# Patient Record
Sex: MALE | Race: OTHER | HISPANIC OR LATINO | URBAN - METROPOLITAN AREA
[De-identification: names, ages, dates, MRNs, and addresses within clinical notes are randomized per-mention and may not be internally consistent; named-entity substitution may affect disease eponyms.]

---

## 2017-07-18 ENCOUNTER — EMERGENCY (EMERGENCY)
Facility: HOSPITAL | Age: 47
LOS: 0 days | Discharge: HOME | End: 2017-07-19

## 2017-07-18 DIAGNOSIS — N20.0 CALCULUS OF KIDNEY: ICD-10-CM

## 2017-07-18 DIAGNOSIS — N28.89 OTHER SPECIFIED DISORDERS OF KIDNEY AND URETER: ICD-10-CM

## 2017-07-18 DIAGNOSIS — Z88.2 ALLERGY STATUS TO SULFONAMIDES: ICD-10-CM

## 2017-07-18 DIAGNOSIS — Z79.899 OTHER LONG TERM (CURRENT) DRUG THERAPY: ICD-10-CM

## 2017-07-18 DIAGNOSIS — Z79.2 LONG TERM (CURRENT) USE OF ANTIBIOTICS: ICD-10-CM

## 2017-07-18 DIAGNOSIS — R10.9 UNSPECIFIED ABDOMINAL PAIN: ICD-10-CM

## 2018-07-13 ENCOUNTER — INPATIENT (INPATIENT)
Facility: HOSPITAL | Age: 48
LOS: 1 days | Discharge: HOME | End: 2018-07-15
Attending: HOSPITALIST | Admitting: HOSPITALIST

## 2018-07-13 VITALS
SYSTOLIC BLOOD PRESSURE: 153 MMHG | HEART RATE: 57 BPM | OXYGEN SATURATION: 99 % | RESPIRATION RATE: 18 BRPM | TEMPERATURE: 97 F | DIASTOLIC BLOOD PRESSURE: 75 MMHG

## 2018-07-13 DIAGNOSIS — Z90.5 ACQUIRED ABSENCE OF KIDNEY: Chronic | ICD-10-CM

## 2018-07-13 LAB
ALBUMIN SERPL ELPH-MCNC: 4.7 G/DL — SIGNIFICANT CHANGE UP (ref 3.5–5.2)
ALP SERPL-CCNC: 70 U/L — SIGNIFICANT CHANGE UP (ref 30–115)
ALT FLD-CCNC: 24 U/L — SIGNIFICANT CHANGE UP (ref 0–41)
ANION GAP SERPL CALC-SCNC: 13 MMOL/L — SIGNIFICANT CHANGE UP (ref 7–14)
APTT BLD: 35.6 SEC — SIGNIFICANT CHANGE UP (ref 27–39.2)
AST SERPL-CCNC: 17 U/L — SIGNIFICANT CHANGE UP (ref 0–41)
BASOPHILS # BLD AUTO: 0.02 K/UL — SIGNIFICANT CHANGE UP (ref 0–0.2)
BASOPHILS NFR BLD AUTO: 0.3 % — SIGNIFICANT CHANGE UP (ref 0–1)
BILIRUB SERPL-MCNC: 1 MG/DL — SIGNIFICANT CHANGE UP (ref 0.2–1.2)
BUN SERPL-MCNC: 20 MG/DL — SIGNIFICANT CHANGE UP (ref 10–20)
CALCIUM SERPL-MCNC: 9.4 MG/DL — SIGNIFICANT CHANGE UP (ref 8.5–10.1)
CHLORIDE SERPL-SCNC: 105 MMOL/L — SIGNIFICANT CHANGE UP (ref 98–110)
CK MB CFR SERPL CALC: 1.2 NG/ML — SIGNIFICANT CHANGE UP (ref 0.6–6.3)
CK SERPL-CCNC: 82 U/L — SIGNIFICANT CHANGE UP (ref 0–225)
CO2 SERPL-SCNC: 25 MMOL/L — SIGNIFICANT CHANGE UP (ref 17–32)
CREAT SERPL-MCNC: 1.2 MG/DL — SIGNIFICANT CHANGE UP (ref 0.7–1.5)
EOSINOPHIL # BLD AUTO: 0.59 K/UL — SIGNIFICANT CHANGE UP (ref 0–0.7)
EOSINOPHIL NFR BLD AUTO: 8.8 % — HIGH (ref 0–8)
GAS PNL BLDV: SIGNIFICANT CHANGE UP
GLUCOSE SERPL-MCNC: 122 MG/DL — HIGH (ref 70–99)
HCT VFR BLD CALC: 39.7 % — LOW (ref 42–52)
HGB BLD-MCNC: 13.9 G/DL — LOW (ref 14–18)
IMM GRANULOCYTES NFR BLD AUTO: 0.6 % — HIGH (ref 0.1–0.3)
INR BLD: 1.06 RATIO — SIGNIFICANT CHANGE UP (ref 0.65–1.3)
LYMPHOCYTES # BLD AUTO: 1.06 K/UL — LOW (ref 1.2–3.4)
LYMPHOCYTES # BLD AUTO: 15.8 % — LOW (ref 20.5–51.1)
MCHC RBC-ENTMCNC: 29.1 PG — SIGNIFICANT CHANGE UP (ref 27–31)
MCHC RBC-ENTMCNC: 35 G/DL — SIGNIFICANT CHANGE UP (ref 32–37)
MCV RBC AUTO: 83.1 FL — SIGNIFICANT CHANGE UP (ref 80–94)
MONOCYTES # BLD AUTO: 0.54 K/UL — SIGNIFICANT CHANGE UP (ref 0.1–0.6)
MONOCYTES NFR BLD AUTO: 8.1 % — SIGNIFICANT CHANGE UP (ref 1.7–9.3)
NEUTROPHILS # BLD AUTO: 4.45 K/UL — SIGNIFICANT CHANGE UP (ref 1.4–6.5)
NEUTROPHILS NFR BLD AUTO: 66.4 % — SIGNIFICANT CHANGE UP (ref 42.2–75.2)
NRBC # BLD: 0 /100 WBCS — SIGNIFICANT CHANGE UP (ref 0–0)
PLATELET # BLD AUTO: 184 K/UL — SIGNIFICANT CHANGE UP (ref 130–400)
POTASSIUM SERPL-MCNC: 4.2 MMOL/L — SIGNIFICANT CHANGE UP (ref 3.5–5)
POTASSIUM SERPL-SCNC: 4.2 MMOL/L — SIGNIFICANT CHANGE UP (ref 3.5–5)
PROT SERPL-MCNC: 7.2 G/DL — SIGNIFICANT CHANGE UP (ref 6–8)
PROTHROM AB SERPL-ACNC: 11.4 SEC — SIGNIFICANT CHANGE UP (ref 9.95–12.87)
RBC # BLD: 4.78 M/UL — SIGNIFICANT CHANGE UP (ref 4.7–6.1)
RBC # FLD: 13.2 % — SIGNIFICANT CHANGE UP (ref 11.5–14.5)
SODIUM SERPL-SCNC: 143 MMOL/L — SIGNIFICANT CHANGE UP (ref 135–146)
TROPONIN T SERPL-MCNC: <0.01 NG/ML — SIGNIFICANT CHANGE UP
WBC # BLD: 6.7 K/UL — SIGNIFICANT CHANGE UP (ref 4.8–10.8)
WBC # FLD AUTO: 6.7 K/UL — SIGNIFICANT CHANGE UP (ref 4.8–10.8)

## 2018-07-13 RX ORDER — ATORVASTATIN CALCIUM 80 MG/1
80 TABLET, FILM COATED ORAL AT BEDTIME
Qty: 0 | Refills: 0 | Status: DISCONTINUED | OUTPATIENT
Start: 2018-07-13 | End: 2018-07-15

## 2018-07-13 RX ORDER — ALFUZOSIN HYDROCHLORIDE 10 MG/1
1 TABLET, EXTENDED RELEASE ORAL
Qty: 0 | Refills: 0 | COMMUNITY

## 2018-07-13 RX ORDER — SODIUM CHLORIDE 9 MG/ML
1000 INJECTION INTRAMUSCULAR; INTRAVENOUS; SUBCUTANEOUS
Qty: 0 | Refills: 0 | Status: DISCONTINUED | OUTPATIENT
Start: 2018-07-13 | End: 2018-07-15

## 2018-07-13 RX ORDER — ENOXAPARIN SODIUM 100 MG/ML
40 INJECTION SUBCUTANEOUS DAILY
Qty: 0 | Refills: 0 | Status: DISCONTINUED | OUTPATIENT
Start: 2018-07-13 | End: 2018-07-15

## 2018-07-13 RX ORDER — ACETAMINOPHEN 500 MG
650 TABLET ORAL EVERY 4 HOURS
Qty: 0 | Refills: 0 | Status: DISCONTINUED | OUTPATIENT
Start: 2018-07-13 | End: 2018-07-15

## 2018-07-13 RX ORDER — ASPIRIN/CALCIUM CARB/MAGNESIUM 324 MG
325 TABLET ORAL ONCE
Qty: 0 | Refills: 0 | Status: COMPLETED | OUTPATIENT
Start: 2018-07-13 | End: 2018-07-13

## 2018-07-13 RX ORDER — TAMSULOSIN HYDROCHLORIDE 0.4 MG/1
0.4 CAPSULE ORAL AT BEDTIME
Qty: 0 | Refills: 0 | Status: DISCONTINUED | OUTPATIENT
Start: 2018-07-13 | End: 2018-07-15

## 2018-07-13 RX ORDER — SODIUM CHLORIDE 9 MG/ML
1000 INJECTION INTRAMUSCULAR; INTRAVENOUS; SUBCUTANEOUS ONCE
Qty: 0 | Refills: 0 | Status: COMPLETED | OUTPATIENT
Start: 2018-07-13 | End: 2018-07-13

## 2018-07-13 RX ORDER — ASPIRIN/CALCIUM CARB/MAGNESIUM 324 MG
162 TABLET ORAL DAILY
Qty: 0 | Refills: 0 | Status: DISCONTINUED | OUTPATIENT
Start: 2018-07-13 | End: 2018-07-14

## 2018-07-13 RX ORDER — ASCORBIC ACID 60 MG
1 TABLET,CHEWABLE ORAL
Qty: 0 | Refills: 0 | COMMUNITY

## 2018-07-13 RX ORDER — ESOMEPRAZOLE MAGNESIUM 40 MG/1
1 CAPSULE, DELAYED RELEASE ORAL
Qty: 0 | Refills: 0 | COMMUNITY

## 2018-07-13 RX ORDER — PANTOPRAZOLE SODIUM 20 MG/1
40 TABLET, DELAYED RELEASE ORAL
Qty: 0 | Refills: 0 | Status: DISCONTINUED | OUTPATIENT
Start: 2018-07-13 | End: 2018-07-15

## 2018-07-13 RX ORDER — CHOLECALCIFEROL (VITAMIN D3) 125 MCG
1 CAPSULE ORAL
Qty: 0 | Refills: 0 | COMMUNITY

## 2018-07-13 RX ADMIN — Medication 325 MILLIGRAM(S): at 12:16

## 2018-07-13 RX ADMIN — SODIUM CHLORIDE 75 MILLILITER(S): 9 INJECTION INTRAMUSCULAR; INTRAVENOUS; SUBCUTANEOUS at 18:40

## 2018-07-13 RX ADMIN — SODIUM CHLORIDE 1000 MILLILITER(S): 9 INJECTION INTRAMUSCULAR; INTRAVENOUS; SUBCUTANEOUS at 11:57

## 2018-07-13 RX ADMIN — TAMSULOSIN HYDROCHLORIDE 0.4 MILLIGRAM(S): 0.4 CAPSULE ORAL at 21:50

## 2018-07-13 RX ADMIN — ATORVASTATIN CALCIUM 80 MILLIGRAM(S): 80 TABLET, FILM COATED ORAL at 21:50

## 2018-07-13 RX ADMIN — ENOXAPARIN SODIUM 40 MILLIGRAM(S): 100 INJECTION SUBCUTANEOUS at 21:50

## 2018-07-13 NOTE — H&P ADULT - ASSESSMENT
48M with pmhx of CVA (no residual weakness ~10 years ago), BPH, GERD, Renal tumor stage III (unspecified exactly which) s/p R partial nephrectomy presents from home for Right sided facial numbness, weakness, and slurred speech    R Sided facial weakness, numbness, and Dysarthria: R/O stroke   NIHSS 3 on admission   CTH neg, CTA pending  2d Echo   Admit to stroke unit  ASA 162mg and NS at 75 as per neuro recs  Starting Lipitor 80mg   Neurology following  Speech and Swallow eval    BPH: No alfazosin in hospital, will start tamsulosin     GERD: Continue PPI     DVT Ppx: lovenox  Full code  Diet: DASH 48M with pmhx of CVA (no residual weakness ~10 years ago), BPH, GERD, Renal tumor stage III (unspecified exactly which) s/p R partial nephrectomy presents from home for Right sided facial numbness, weakness, and slurred speech    R Sided facial weakness, numbness, and Dysarthria: R/O stroke   NIHSS 3 on admission   CTH neg, CTA pending  2d Echo   Admit to stroke unit  ASA 162mg and NS at 75 as per neuro recs  Starting Lipitor 80mg   Neurology following  Speech and Swallow eval    BPH: No alfazosin in hospital, will start tamsulosin     GERD: Continue PPI     DVT Ppx: lovenox  Full code  Diet: DASH     Attending co-signer attestation:  Pt seen and examined. Agree with Dr. Hopson and Resident notes. Pt here to r/o CVA. Neuro eval. Neuro Checks, ASA, Statins, BP control. f/u w/up per Neurology.

## 2018-07-13 NOTE — ED ADULT NURSE NOTE - OBJECTIVE STATEMENT
Patient reports waking up this morning with slurred speech and numbness to the right face, Patient has hx of CVA approx 10 years ago without any residual weakness.

## 2018-07-13 NOTE — ED PROVIDER NOTE - PROGRESS NOTE DETAILS
I personally evaluated the patient. I reviewed the Resident’s or Physician Assistant’s note (as assigned above), and agree with the findings and plan except as documented in my note.   49 Y/O M GERD, BPH, TIA (ON ASA 81 MG DAILY), FORMER SMOKER, S/P PARTIAL NEPHRECTOMY C/O R SIDED FACIAL NUMBNESS, DROOP AND SLURRED SPEECH SINCE AWAKENING AT 8 AM TODAY. PT WENT TO BED AT 2AM WITH NO SYMPTOMS. NO EXTREMITY PARESTHESIAS OR MOTOR WEAKNESS. NO ATAXIA. NO HEADACHE, N/V, DIZZINESS, VISION CHANGES. NO CP, SOB, PALPITATIONS. PT WITH L SIDED PARESTHESIAS 2 WEEKS AGO WHICH RESOLVED. VITALS NOTED. ALERT OX3 NAD WELL APPEARING. NCAT, PERRL EOMI. CN 2-12 INTACT EXCEPT FOR R NASOLABIAL FLATTENING AND R LOWER FACE PARESTHESIAS. + MILD DYSARTHRIA. NORMAL VISUAL FIELDS. 5/5 MOTOR STRENGTH B/L UPPER AND B/L LOWER EXT. NO PRONATOR DRIFT. NORMAL FINGER TO NOSE B/L. NECK SUPPLE. NECK SUPPLE. LUNGS CLEAR B/L. RRR S1S2. ABD- SOFT NONTENDER. NO RASH.

## 2018-07-13 NOTE — H&P ADULT - NSHPPHYSICALEXAM_GEN_ALL_CORE
Vital Signs Last 24 Hrs  T(C): 36.2 (13 Jul 2018 13:03), Max: 36.2 (13 Jul 2018 13:03)  T(F): 97.2 (13 Jul 2018 13:03), Max: 97.2 (13 Jul 2018 13:03)  HR: 51 (13 Jul 2018 13:05) (51 - 57)  BP: 148/73 (13 Jul 2018 13:05) (148/73 - 159/84)  RR: 18 (13 Jul 2018 13:05) (18 - 18)  SpO2: 100% (13 Jul 2018 13:05) (99% - 100%)        GENERAL: NAD, well-developed, Non-toxic, stated age   HEAD:  Atraumatic, Normocephalic  EYES: EOMI, PERRLA, conjunctiva and sclera clear. Noted R infraorbital fasciculations. Bandaged wound on L temple s/p mole removal    CHEST/LUNG: Clear to auscultation bilaterally; No wheezing, rhonchi, or crackles  HEART: Bradycardic, regular rhythm; s1, s2, No murmurs, rubs, or gallops  ABDOMEN: Soft, Nontender, Nondistended; Bowel sounds present, No rebound or guarding noted   EXTREMITIES:  No lower extremity edema or calf tenderness to palpation.  No clubbing or cyanosis  PSYCH: AAOx3  NEUROLOGY: CN II-XII grossly intact except for decreased sensation of the Right face present in all distributions of the CN V, R infraorbital fasciculation noted, Mild dysarthria noted but speech remain intelligible, communicating effectively without difficulty expressing or word finding.  5/5 strength in all extremities, no pronator drift, dysdiadokinesia, ataxia noted on exam.   SKIN: No rashes or lesions Vital Signs Last 24 Hrs  T(C): 36.2 (13 Jul 2018 13:03), Max: 36.2 (13 Jul 2018 13:03)  T(F): 97.2 (13 Jul 2018 13:03), Max: 97.2 (13 Jul 2018 13:03)  HR: 51 (13 Jul 2018 13:05) (51 - 57)  BP: 148/73 (13 Jul 2018 13:05) (148/73 - 159/84)  RR: 18 (13 Jul 2018 13:05) (18 - 18)  SpO2: 100% (13 Jul 2018 13:05) (99% - 100%)        GENERAL: NAD, well-developed, Non-toxic, stated age   HEAD:  Atraumatic, Normocephalic  EYES: EOMI, PERRLA, conjunctiva and sclera clear. Noted R infraorbital fasciculations. Bandaged wound on L temple s/p mole removal ; R facial droop  CHEST/LUNG: Clear to auscultation bilaterally; No wheezing, rhonchi, or crackles  HEART: Bradycardic, regular rhythm; s1, s2, No murmurs, rubs, or gallops  ABDOMEN: Soft, Nontender, Nondistended; Bowel sounds present, No rebound or guarding noted   EXTREMITIES:  No lower extremity edema or calf tenderness to palpation.  No clubbing or cyanosis  PSYCH: AAOx3  NEUROLOGY: CN II-XII grossly intact except for decreased sensation of the Right face present in all distributions of the CN V, R infraorbital fasciculation noted, Mild dysarthria noted but speech remain intelligible, communicating effectively without difficulty expressing or word finding.  5/5 strength in all extremities, no pronator drift, dysdiadokinesia, ataxia noted on exam.   SKIN: No rashes or lesions Vital Signs Last 24 Hrs  T(C): 36.2 (13 Jul 2018 13:03), Max: 36.2 (13 Jul 2018 13:03)  T(F): 97.2 (13 Jul 2018 13:03), Max: 97.2 (13 Jul 2018 13:03)  HR: 51 (13 Jul 2018 13:05) (51 - 57)  BP: 148/73 (13 Jul 2018 13:05) (148/73 - 159/84)  RR: 18 (13 Jul 2018 13:05) (18 - 18)  SpO2: 100% (13 Jul 2018 13:05) (99% - 100%)      GENERAL: NAD, well-developed, Non-toxic, stated age   HEAD:  Atraumatic, Normocephalic  EYES: EOMI, PERRLA, conjunctiva and sclera clear. Noted R infraorbital fasciculations. Bandaged wound on L temple s/p mole removal ; R facial droop  CHEST/LUNG: Clear to auscultation bilaterally; No wheezing, rhonchi, or crackles  HEART: Bradycardic, regular rhythm; s1, s2, No murmurs, rubs, or gallops  ABDOMEN: Soft, Nontender, Nondistended; Bowel sounds present, No rebound or guarding noted   EXTREMITIES:  No lower extremity edema or calf tenderness to palpation.  No clubbing or cyanosis  PSYCH: AAOx3  NEUROLOGY: CN II-XII grossly intact except for decreased sensation of the Right face present in all distributions of the CN V, R infraorbital fasciculation noted, Mild dysarthria noted but speech remain intelligible, communicating effectively without difficulty expressing or word finding.  5/5 strength in all extremities, no pronator drift, dysdiadokinesia, ataxia noted on exam.   SKIN: No rashes or lesions

## 2018-07-13 NOTE — ED PROVIDER NOTE - NS ED ROS FT
Eyes:  No visual changes, eye pain or discharge.  ENMT:  no sore throat or runny nose, no difficulty swallowing + slurred speech.   Cardiac:  No chest pain, SOB   Respiratory:  No cough   GI:  No nausea, vomiting, diarrhea or abdominal pain.  :  No dysuria, frequency or burning.  MS:  Nojoint pain or back pain.  Neuro:  No headache or weakness. no dizziness + right facial weakness and numbness with slurred speech.   Skin:  No skin rash.   Endocrine: No history of thyroid disease or diabetes.

## 2018-07-13 NOTE — H&P ADULT - NSHPLABSRESULTS_GEN_ALL_CORE
13.9   6.70  )-----------( 184      ( 13 Jul 2018 11:41 )             39.7       07-13    143  |  105  |  20  ----------------------------<  122<H>  4.2   |  25  |  1.2    Ca    9.4      13 Jul 2018 11:41    TPro  7.2  /  Alb  4.7  /  TBili  1.0  /  DBili  x   /  AST  17  /  ALT  24  /  AlkPhos  70  07-13          PT/INR - ( 13 Jul 2018 11:41 )   PT: 11.40 sec;   INR: 1.06 ratio     PTT - ( 13 Jul 2018 11:41 )  PTT:35.6 sec      CARDIAC MARKERS ( 13 Jul 2018 11:41 )  x     / <0.01 ng/mL / 82 U/L / x     / 1.2 ng/mL      CAPILLARY BLOOD GLUCOSE  120 (13 Jul 2018 11:42)

## 2018-07-13 NOTE — H&P ADULT - PMH
BPH (benign prostatic hyperplasia)    CVA (cerebral vascular accident)    GERD (gastroesophageal reflux disease)    Renal cancer, right

## 2018-07-13 NOTE — PROGRESS NOTE ADULT - ASSESSMENT
48 year old gentleman with a past medical history of ischemic stroke, presented with right facial parasthesias, dysarthria and right nasolabial flattening. Patient was last known asymptomatic at 0200, he woke up at 0800 with right facial tingling and his wife noticed his speech was different than baseline. He was outside the window for IV stroke intervention. CTH was negative for acute intracranial pathology. Etiology of symptoms unknown at this time, awaiting stroke work up.    plan  Follow up pending MRI Brain and  MRA head/ neck  Follow pending TTE with contrast to rule out right to left shunt.  Telemetry monitoring.  PT OT Rehab.   mg daily.  Lipitor 80 mg QHS.  Call for acute change in status/Increase in NIHSS 48 year old gentleman with a past medical history of ischemic stroke, presented with right facial parasthesias, dysarthria and right nasolabial flattening. Patient was last known asymptomatic at 0200, he woke up at 0800 with right facial tingling and his wife noticed his speech was different than baseline. He was outside the window for IV stroke intervention. CTH was negative for acute intracranial pathology. Etiology of symptoms unknown at this time, awaiting stroke work up.    plan  Follow up pending MRI Brain and  MRA head/ neck  Follow pending TTE with contrast to rule out right to left shunt.  Telemetry monitoring.  PT OT Rehab.   mg daily.  Lipitor 80 mg QHS.

## 2018-07-13 NOTE — PROGRESS NOTE ADULT - SUBJECTIVE AND OBJECTIVE BOX
JOSÉ MIGUEL ANNE    Chief Complaint: Right facial palsy, right facial parasthesia and dysarthria.    Right Handed    HPI:  48 year old gentleman with a past medical history of ischemic stroke, presented with right facial parasthesias, dysarthria and right nasolabial flattening. Patient was last known asymptomatic at 0200, he woke up at 0800 with right facial tingling and his wife noticed his speech was different than baseline. He was outside the window for IV stroke intervention. CTH was negative for acute intracranial pathology.    Relevant PMH:  [x] Prior ischemic stroke/TIA  [] Afib  []CAD  []HTN  []DLD  []DM []PVD []Obesity [] Sedintary lifestyle []CHF  []CINDY  []Cancer Hx     Social History: [] Smoking []  Drug Use: []   Alcohol Use:   [] Other:      Possible Location of Stroke:  Unknown at this time will comment post stroke workup.  Possible Cause of Stroke:  Unknown at this time will comment post stroke workup.  Relevant Cervicocerebral Imaging:  CTA pending.    Relevant blood tests:  LDL/A1C pending.    Relevant cardiac rhythm monitoring:  Recently placed on telemetry monitoring.  Relevant Cardiac Structure:(TTE/ABDULAZIZ +/-):No intracardiac thrombus/vegetation/akynesia/EF:  TTE pending.    Home Medications:  alfuzosin 10 mg oral tablet, extended release: 1 tab(s) orally once a day (13 Jul 2018 11:40)  Aspir 81 oral delayed release tablet: 1 tab(s) orally once a day (13 Jul 2018 11:40)  NexIUM 40 mg oral delayed release capsule: 1 cap(s) orally once a day (13 Jul 2018 11:40)  Vitamin C 25 mg oral tablet, chewable: 1 tab(s) orally once a day (13 Jul 2018 11:40)      MEDICATIONS  (STANDING):      PT/OT/Speech/Rehab/S&Swr: pending.    Exam:    Vital Signs Last 24 Hrs  T(C): 36 (13 Jul 2018 11:29), Max: 36 (13 Jul 2018 11:29)  T(F): 96.8 (13 Jul 2018 11:29), Max: 96.8 (13 Jul 2018 11:29)  HR: 55 (13 Jul 2018 11:39) (55 - 57)  BP: 159/84 (13 Jul 2018 11:39) (153/75 - 159/84)  BP(mean): --  RR: 18 (13 Jul 2018 11:29) (18 - 18)  SpO2: 99% (13 Jul 2018 11:39) (99% - 99%)    NIHSS      LOC:       1a:0     1b(Questions): 0          1c(Instructions):   0          Best Gaze:0  Visual:0  Motor:                 RUE: 0    RLE:  0   LUE: 0    LLE:  0   FACE:   1R  Limb Ataxia:0  Sensory: 1      Language:   0    Dysarthria:    1      Extinction and Inattention:0    NIHSS on admission:      3               m-RS:1    Impression:  48 year old gentleman with a past medical history of ischemic stroke, presented with right facial parasthesias, dysarthria and right nasolabial flattening. Patient was last known asymptomatic at 0200, he woke up at 0800 with right facial tingling and his wife noticed his speech was different than baseline. He was outside the window for IV stroke intervention. CTH was negative for acute intracranial pathology. Etiology of symptoms unknown at this time, will have a better understanding post stroke workup.    Suggestion:  Routine stroke workup including:  MRI Brain without ebony  MRA or CTA of head and neck.  TTE with contrast to rule out right to left shunt.  Telemetry monitoring.  PT OT Rehab.   mg daily.  Lipitor 80 mg QHS.  IVF: Normal Saline 75 ml per hour.  Disposition:  Stroke Unit.    We spent more than 60 minutes to review the chart, gather necessary information, evaluate the patient and discuss the case with primary team and counseling the family to their satisfaction.  Jonny Vang NP Neurovascular/Stroke  [phone] JOSÉ MIGUEL ANNE    Chief Complaint: Right facial palsy, right facial parasthesia and dysarthria.    Right Handed    HPI:  48 year old gentleman with a past medical history of ischemic stroke, presented with right facial parasthesias, dysarthria and right nasolabial flattening. Patient was last known asymptomatic at 0200, he woke up at 0800 with right facial tingling and his wife noticed his speech was different than baseline. He was outside the window for IV stroke intervention. CTH was negative for acute intracranial pathology.    Relevant PMH:  [x] Prior ischemic stroke/TIA  [] Afib  []CAD  []HTN  []DLD  []DM []PVD []Obesity [] Sedintary lifestyle []CHF  []CINDY  []Cancer Hx     Social History: [] Smoking []  Drug Use: []   Alcohol Use:   [] Other:      Possible Location of Stroke:  Unknown at this time will comment post stroke workup.  Possible Cause of Stroke:  Unknown at this time will comment post stroke workup.  Relevant Cervicocerebral Imaging:  CTA pending.    Relevant blood tests:  LDL/A1C pending.    Relevant cardiac rhythm monitoring:  Recently placed on telemetry monitoring.  Relevant Cardiac Structure:(TTE/ABDULAZIZ +/-):No intracardiac thrombus/vegetation/akynesia/EF:  TTE pending.    Home Medications:  alfuzosin 10 mg oral tablet, extended release: 1 tab(s) orally once a day (13 Jul 2018 11:40)  Aspir 81 oral delayed release tablet: 1 tab(s) orally once a day (13 Jul 2018 11:40)  NexIUM 40 mg oral delayed release capsule: 1 cap(s) orally once a day (13 Jul 2018 11:40)  Vitamin C 25 mg oral tablet, chewable: 1 tab(s) orally once a day (13 Jul 2018 11:40)      MEDICATIONS  (STANDING):      PT/OT/Speech/Rehab/S&Swr: pending.    Exam:    Vital Signs Last 24 Hrs  T(C): 36 (13 Jul 2018 11:29), Max: 36 (13 Jul 2018 11:29)  T(F): 96.8 (13 Jul 2018 11:29), Max: 96.8 (13 Jul 2018 11:29)  HR: 55 (13 Jul 2018 11:39) (55 - 57)  BP: 159/84 (13 Jul 2018 11:39) (153/75 - 159/84)  BP(mean): --  RR: 18 (13 Jul 2018 11:29) (18 - 18)  SpO2: 99% (13 Jul 2018 11:39) (99% - 99%)    NIHSS      LOC:       1a:0     1b(Questions): 0          1c(Instructions):   0          Best Gaze:0  Visual:0  Motor:                 RUE: 0    RLE:  0   LUE: 0    LLE:  0   FACE:   1R  Limb Ataxia:0  Sensory: 1      Language:   0    Dysarthria:    1      Extinction and Inattention:0    NIHSS on admission:      3               m-RS:1    Impression:  48 year old gentleman with a past medical history of ischemic stroke, presented with right facial parasthesias, dysarthria and right nasolabial flattening. Patient was last known asymptomatic at 0200, he woke up at 0800 with right facial tingling and his wife noticed his speech was different than baseline. He was outside the window for IV stroke intervention. CTH was negative for acute intracranial pathology. Etiology of symptoms unknown at this time, will have a better understanding post stroke workup.    Suggestion:  Routine stroke workup including:  MRI Brain without ebony  MRA head without ebony  MRA neck without ebony.  TTE with contrast to rule out right to left shunt.  Telemetry monitoring.  PT OT Rehab.   mg daily.  Lipitor 80 mg QHS.  IVF: Normal Saline 75 ml per hour.  Disposition:  Stroke Unit.    We spent more than 60 minutes to review the chart, gather necessary information, evaluate the patient and discuss the case with primary team and counseling the family to their satisfaction.  Jonny Vang NP Neurovascular/Stroke  [phone] JOSÉ MIGUEL ANNE    Chief Complaint: Right facial palsy, right facial parasthesia and dysarthria.    Right Handed    HPI:  48 year old gentleman with a past medical history of ischemic stroke, presented with right facial parasthesias, dysarthria and right nasolabial flattening. Patient was last known asymptomatic at 0200, he woke up at 0800 with right facial tingling and his wife noticed his speech was different than baseline. He was outside the window for IV stroke intervention. CTH was negative for acute intracranial pathology.    Relevant PMH:  [x] Prior ischemic stroke/TIA  [] Afib  []CAD  []HTN  []DLD  []DM []PVD []Obesity [] Sedintary lifestyle []CHF  []CINDY  []Cancer Hx     Social History: [] Smoking []  Drug Use: []   Alcohol Use:   [] Other:      Possible Location of Stroke:  Unknown at this time will comment post stroke workup.  Possible Cause of Stroke:  Unknown at this time will comment post stroke workup.  Relevant Cervicocerebral Imaging:  CTA pending.    Relevant blood tests:  LDL/A1C pending.    Relevant cardiac rhythm monitoring:  Recently placed on telemetry monitoring.  Relevant Cardiac Structure:(TTE/ABDULAZIZ +/-):No intracardiac thrombus/vegetation/akynesia/EF:  TTE pending.    Home Medications:  alfuzosin 10 mg oral tablet, extended release: 1 tab(s) orally once a day (13 Jul 2018 11:40)  Aspir 81 oral delayed release tablet: 1 tab(s) orally once a day (13 Jul 2018 11:40)  NexIUM 40 mg oral delayed release capsule: 1 cap(s) orally once a day (13 Jul 2018 11:40)  Vitamin C 25 mg oral tablet, chewable: 1 tab(s) orally once a day (13 Jul 2018 11:40)      MEDICATIONS  (STANDING):      PT/OT/Speech/Rehab/S&Swr: pending.    Exam:    Vital Signs Last 24 Hrs  T(C): 36 (13 Jul 2018 11:29), Max: 36 (13 Jul 2018 11:29)  T(F): 96.8 (13 Jul 2018 11:29), Max: 96.8 (13 Jul 2018 11:29)  HR: 55 (13 Jul 2018 11:39) (55 - 57)  BP: 159/84 (13 Jul 2018 11:39) (153/75 - 159/84)  BP(mean): --  RR: 18 (13 Jul 2018 11:29) (18 - 18)  SpO2: 99% (13 Jul 2018 11:39) (99% - 99%)    NIHSS      LOC:       1a:0     1b(Questions): 0          1c(Instructions):   0          Best Gaze:0  Visual:0  Motor:                 RUE: 0    RLE:  0   LUE: 0    LLE:  0   FACE:   1R  Limb Ataxia:0  Sensory: 1      Language:   0    Dysarthria:    1      Extinction and Inattention:0    NIHSS on admission:      3               m-RS:1    Impression:  48 year old gentleman with a past medical history of ischemic stroke, presented with right facial parasthesias, dysarthria and right nasolabial flattening. Patient was last known asymptomatic at 0200, he woke up at 0800 with right facial tingling and his wife noticed his speech was different than baseline. He was outside the window for IV stroke intervention. CTH was negative for acute intracranial pathology. Etiology of symptoms unknown at this time, will have a better understanding post stroke workup.    Suggestion:  Routine stroke workup including:  MRI Brain without ebony  MRA head without ebony  MRA neck without ebony.  TTE with contrast to rule out right to left shunt.  Telemetry monitoring.  Management:  PT OT Rehab.   mg daily.  Lipitor 80 mg QHS.  IVF: Normal Saline 75 ml per hour.  Disposition:  Stroke Unit.    We spent more than 60 minutes to review the chart, gather necessary information, evaluate the patient and discuss the case with primary team and counseling the family to their satisfaction.  Jonny Vang NP Neurovascular/Stroke  [phone]  I, Ayleen Andrade, examined the patient and discussed this case, including plan of management with the Stroke team.

## 2018-07-13 NOTE — ED PROVIDER NOTE - OBJECTIVE STATEMENT
47 yo M pmh of previous strokes presents with right facial weakness and slurred speech. Went to sleep at 2am normal as per wife then woke up at 8am with new symptoms. Noted to have slurred speech and right sided facial weakness and decreased sensation. Ignored the symptoms this morning then went to the dermatologist for a biopsy and then his wife told him to come to the ED. no cp, no sob no n/v/d, no dizziness, no headache.

## 2018-07-13 NOTE — H&P ADULT - HISTORY OF PRESENT ILLNESS
48M with pmhx of CVA (no residual weakness ~10 years ago), BPH, GERD, Renal tumor stage III (unspecified exactly which) s/p R partial nephrectomy presents from home for Right sided facial numbness, weakness, and slurred speech. Patient awoke at 8am with these symptoms, wife last saw him normal at 2am the previous night. Patient had gone to the dermatologist to have mole removed. Symptoms didn't resolve and wife brought him in.  Patient states that he had similar symptoms two weeks ago on the left side of his face, though more pronounced then and associated with left hand 2 and 3rd digit numbness as well. Patient has noticed palpitations at that time but not during this current episode. Patient completed course of amoxicillin for URI, no residual symptoms or complications noted post treatment. Denies fever, chills, chest pain, SOB, weakness, numbness out side of R face, difficulty walking, blurred vision, headache, or n/v/d, abdominal pain.     In ED code stroke called, CTH negative, CTA done but pending, No TPA given due to outside of therapeutic window. 48M with pmhx of CVA (no residual weakness ~10 years ago), BPH, GERD, Renal tumor stage III (unspecified exactly which) s/p R partial nephrectomy presents from home for Right sided facial numbness, weakness, and slurred speech. Patient awoke at 8am with these symptoms, wife last saw him normal at 2am the previous night. Patient had gone to the dermatologist to have mole removed. Symptoms didn't resolve and wife brought him in.  Patient states that he had similar symptoms two weeks ago on the left side of his face, though more pronounced then and associated with left hand 2 and 3rd digit numbness as well. Patient has noticed palpitations at that time but not during this current episode. Patient completed course of amoxicillin for URI, no residual symptoms or complications noted post treatment. Denies fever, chills, chest pain, SOB, weakness, numbness out side of R face, difficulty walking, blurred vision, headache, or n/v/d, abdominal pain.     In ED code stroke called, CTH negative, CTA done but pending, No TPA given due to outside of therapeutic window.     FMHx: brother with CVA at age 51.

## 2018-07-13 NOTE — ED PROVIDER NOTE - PHYSICAL EXAMINATION
CONSTITUTIONAL: Well-developed; well-nourished; in no acute distress.   SKIN: warm, dry  HEAD: Normocephalic; atraumatic.  EYES: PERRL, EOMI, no conjunctival erythema  ENT: No nasal discharge; airway clear.  NECK: Supple; non tender.  CARD: S1, S2 normal;  Regular rate and rhythm.   RESP: No wheezes, rales or rhonchi.  ABD: soft ntnd  EXT: Normal ROM.   LYMPH: No acute cervical adenopathy.  NEURO: Alert, oriented, grossly unremarkable, 5/5 strength in all 4 extremities. + facial weakness and decreased sensation. NIH of 3.

## 2018-07-13 NOTE — PROGRESS NOTE ADULT - SUBJECTIVE AND OBJECTIVE BOX
Neurology follow up note  Patient seen and examined at bedside ,dysarthria has now resolved. Right sided paresthesias and left facial droop has improved. Denies new complaints.      PMH  Renal cancer, right  BPH (benign prostatic hyperplasia)  GERD (gastroesophageal reflux disease)  CVA (cerebral vascular accident)          Pertinent Medical History/Social History/Family History/other:     Social History: []  Drug Use: []   Alcohol Use: []   Tobacco Use:  [] Other:      Cerebrovascular Risk Factors:[] prior ischemic stroke  [] Afib  []CAD  []HTN  []DLD  []DM []PVD      Stroke Workup:    Cardiac Rhythm(Tele/Holter) & Duration:   12 HRS                Events:none    Cardiac Structure:(TTE/ABDULAZIZ +/-): PENDING          Home Medications:  alfuzosin 10 mg oral tablet, extended release: 1 tab(s) orally once a day (13 Jul 2018 14:08)  Aspir 81 oral delayed release tablet: 1 tab(s) orally once a day (13 Jul 2018 14:08)  NexIUM 40 mg oral delayed release capsule: 1 cap(s) orally once a day (13 Jul 2018 14:08)  Vitamin C 25 mg oral tablet, chewable: 1 tab(s) orally once a day (13 Jul 2018 14:08)  Vitamin D3 1000 intl units oral tablet: 1 tab(s) orally once a day (13 Jul 2018 14:08)      MEDICATIONS  (STANDING):  aspirin enteric coated 162 milliGRAM(s) Oral daily  atorvastatin 80 milliGRAM(s) Oral at bedtime  enoxaparin Injectable 40 milliGRAM(s) SubCutaneous daily  pantoprazole    Tablet 40 milliGRAM(s) Oral before breakfast  sodium chloride 0.9%. 1000 milliLiter(s) (75 mL/Hr) IV Continuous <Continuous>  tamsulosin 0.4 milliGRAM(s) Oral at bedtime      Last 24 hour events:none    Physical Exam:  a/o x 4  cn: intact except for mild decrease in the right NLF  Power: 5/5 throughout, no drift  ftn hks : no dysmetria or limb ataxia  sensory : mild decrease to pp on the right arm and face   gait: deferred      Vital Signs Last 24 Hrs  T(C): 35.9 (13 Jul 2018 19:06), Max: 36.3 (13 Jul 2018 15:41)  T(F): 96.6 (13 Jul 2018 19:06), Max: 97.4 (13 Jul 2018 15:41)  HR: 43 (13 Jul 2018 19:06) (42 - 57)  BP: 148/69 (13 Jul 2018 19:06) (127/75 - 159/84)  BP(mean): --  RR: 18 (13 Jul 2018 19:06) (18 - 19)  SpO2: 98% (13 Jul 2018 15:41) (98% - 100%)    NIHSS      LOC:       1a:0     1b(Questions): 0          1c(Instructions):   0          Best Gaze:0  Visual:0  Motor:                 RUE: 0    RLE:  0   LUE: 0    LLE:  0   FACE:   1R  Limb Ataxia:0  Sensory: 1      Language:   0    Dysarthria:    0     Extinction and Inattention:0    NIHSS on admission:      3        NIHSS today: 2       m-RS:1 Neurology follow up note  Patient seen and examined at bedside ,dysarthria has now resolved. Right sided paresthesias and left facial droop has improved. Denies new complaints.    PMH  Renal cancer, right  BPH (benign prostatic hyperplasia)  GERD (gastroesophageal reflux disease)  CVA (cerebral vascular accident)    Pertinent Medical History/Social History/Family History/other:     Social History: []  Drug Use: []   Alcohol Use: []   Tobacco Use:  [] Other:      Cerebrovascular Risk Factors:[] prior ischemic stroke  [] Afib  []CAD  []HTN  []DLD  []DM []PVD    Stroke Workup:    Cardiac Rhythm(Tele/Holter) & Duration:   12 HRS                Events:none    Cardiac Structure:(TTE/ABDULAZIZ +/-): PENDING    Home Medications:  alfuzosin 10 mg oral tablet, extended release: 1 tab(s) orally once a day (13 Jul 2018 14:08)  Aspir 81 oral delayed release tablet: 1 tab(s) orally once a day (13 Jul 2018 14:08)  NexIUM 40 mg oral delayed release capsule: 1 cap(s) orally once a day (13 Jul 2018 14:08)  Vitamin C 25 mg oral tablet, chewable: 1 tab(s) orally once a day (13 Jul 2018 14:08)  Vitamin D3 1000 intl units oral tablet: 1 tab(s) orally once a day (13 Jul 2018 14:08)      MEDICATIONS  (STANDING):  aspirin enteric coated 162 milliGRAM(s) Oral daily  atorvastatin 80 milliGRAM(s) Oral at bedtime  enoxaparin Injectable 40 milliGRAM(s) SubCutaneous daily  pantoprazole    Tablet 40 milliGRAM(s) Oral before breakfast  sodium chloride 0.9%. 1000 milliLiter(s) (75 mL/Hr) IV Continuous <Continuous>  tamsulosin 0.4 milliGRAM(s) Oral at bedtime      Last 24 hour events:none    Physical Exam:  a/o x 4  cn: intact except for mild decrease in the right NLF  Power: 5/5 throughout, no drift  ftn hks : no dysmetria or limb ataxia  sensory : mild decrease to pp on the right arm and face   gait: deferred      Vital Signs Last 24 Hrs  T(C): 35.9 (13 Jul 2018 19:06), Max: 36.3 (13 Jul 2018 15:41)  T(F): 96.6 (13 Jul 2018 19:06), Max: 97.4 (13 Jul 2018 15:41)  HR: 43 (13 Jul 2018 19:06) (42 - 57)  BP: 148/69 (13 Jul 2018 19:06) (127/75 - 159/84)  BP(mean): --  RR: 18 (13 Jul 2018 19:06) (18 - 19)  SpO2: 98% (13 Jul 2018 15:41) (98% - 100%)    NIHSS      LOC:       1a:0     1b(Questions): 0          1c(Instructions):   0          Best Gaze:0  Visual:0  Motor:                 RUE: 0    RLE:  0   LUE: 0    LLE:  0   FACE:   1R  Limb Ataxia:0  Sensory: 1      Language:   0    Dysarthria:    0     Extinction and Inattention:0    NIHSS on admission:      3        NIHSS today: 2       m-RS:1

## 2018-07-13 NOTE — H&P ADULT - NSHPSOCIALHISTORY_GEN_ALL_CORE
Marital Status:  (  x )    (   ) Single    (   )    (  )   Lives with: (  ) alone  (  ) children   ( x ) spouse   (  ) parents  (  ) other    Substance Use (street drugs): ( x ) never used  (  ) other:  Tobacco Usage:  (   ) never smoked   (  x ) former smoker   (   ) current smoker : 1/2 pack per day for 10 years, quit 20 years ago   Alcohol Usage: Social

## 2018-07-13 NOTE — H&P ADULT - FAMILY HISTORY
Father  Still living? No  Family history of diabetes mellitus in father, Age at diagnosis: Age Unknown     Mother  Still living? Unknown  Family history of thyroid dysfunction, Age at diagnosis: Age Unknown

## 2018-07-13 NOTE — H&P ADULT - ATTENDING COMMENTS
48M PMHx CVA (2008) with no deficits, BPH, GERD, renal ca s/p R nephrectomy here with right facial droop, slurred speech; RUE numbness. Initial CTH negative, pending MRI, MRA. Deficits improved today as well as numbness. Asa 325 and lipitor per neuro. PT.

## 2018-07-14 LAB
ANION GAP SERPL CALC-SCNC: 12 MMOL/L — SIGNIFICANT CHANGE UP (ref 7–14)
BUN SERPL-MCNC: 20 MG/DL — SIGNIFICANT CHANGE UP (ref 10–20)
CALCIUM SERPL-MCNC: 8.6 MG/DL — SIGNIFICANT CHANGE UP (ref 8.5–10.1)
CHLORIDE SERPL-SCNC: 107 MMOL/L — SIGNIFICANT CHANGE UP (ref 98–110)
CHOLEST SERPL-MCNC: 110 MG/DL — SIGNIFICANT CHANGE UP (ref 100–200)
CO2 SERPL-SCNC: 25 MMOL/L — SIGNIFICANT CHANGE UP (ref 17–32)
CREAT SERPL-MCNC: 1 MG/DL — SIGNIFICANT CHANGE UP (ref 0.7–1.5)
ESTIMATED AVERAGE GLUCOSE: 94 MG/DL — SIGNIFICANT CHANGE UP (ref 68–114)
GLUCOSE SERPL-MCNC: 98 MG/DL — SIGNIFICANT CHANGE UP (ref 70–99)
HBA1C BLD-MCNC: 4.9 % — SIGNIFICANT CHANGE UP (ref 4–5.6)
HDLC SERPL-MCNC: 40 MG/DL — SIGNIFICANT CHANGE UP (ref 40–125)
LIPID PNL WITH DIRECT LDL SERPL: 60 MG/DL — SIGNIFICANT CHANGE UP (ref 4–129)
MAGNESIUM SERPL-MCNC: 2 MG/DL — SIGNIFICANT CHANGE UP (ref 1.8–2.4)
POTASSIUM SERPL-MCNC: 4.1 MMOL/L — SIGNIFICANT CHANGE UP (ref 3.5–5)
POTASSIUM SERPL-SCNC: 4.1 MMOL/L — SIGNIFICANT CHANGE UP (ref 3.5–5)
SODIUM SERPL-SCNC: 144 MMOL/L — SIGNIFICANT CHANGE UP (ref 135–146)
TOTAL CHOLESTEROL/HDL RATIO MEASUREMENT: 2.8 RATIO — LOW (ref 4–5.5)
TRIGL SERPL-MCNC: 70 MG/DL — SIGNIFICANT CHANGE UP (ref 10–149)

## 2018-07-14 RX ORDER — ASPIRIN/CALCIUM CARB/MAGNESIUM 324 MG
325 TABLET ORAL DAILY
Qty: 0 | Refills: 0 | Status: DISCONTINUED | OUTPATIENT
Start: 2018-07-14 | End: 2018-07-15

## 2018-07-14 RX ADMIN — PANTOPRAZOLE SODIUM 40 MILLIGRAM(S): 20 TABLET, DELAYED RELEASE ORAL at 06:07

## 2018-07-14 RX ADMIN — ATORVASTATIN CALCIUM 80 MILLIGRAM(S): 80 TABLET, FILM COATED ORAL at 21:02

## 2018-07-14 RX ADMIN — SODIUM CHLORIDE 75 MILLILITER(S): 9 INJECTION INTRAMUSCULAR; INTRAVENOUS; SUBCUTANEOUS at 06:08

## 2018-07-14 RX ADMIN — SODIUM CHLORIDE 75 MILLILITER(S): 9 INJECTION INTRAMUSCULAR; INTRAVENOUS; SUBCUTANEOUS at 19:28

## 2018-07-14 RX ADMIN — ENOXAPARIN SODIUM 40 MILLIGRAM(S): 100 INJECTION SUBCUTANEOUS at 13:55

## 2018-07-14 RX ADMIN — Medication 325 MILLIGRAM(S): at 13:55

## 2018-07-14 RX ADMIN — TAMSULOSIN HYDROCHLORIDE 0.4 MILLIGRAM(S): 0.4 CAPSULE ORAL at 21:02

## 2018-07-14 NOTE — PROGRESS NOTE ADULT - ASSESSMENT
48M with pmhx of CVA (no residual weakness ~10 years ago), BPH, GERD, Renal tumor stage III (unspecified exactly which) s/p R partial nephrectomy presents from home with right sided facial numbness, weakness, and slurred speech. He was outside the window for IV stroke intervention. CTH was negative for acute intracranial pathology. Etiology of symptoms unknown at this time, awaiting stroke work up. Pt continues to have mild right facial numbness.    # R Sided facial weakness, numbness, and Dysarthria: R/O stroke   -NIHSS 3 on admission   -CTH neg, CTA negative   -pending MRI Brain and  MRA head/ neck  -2d Echo pending  -c/w ASA 325mg and Lipitor 80mg   -Speech and Swallow eval  -PT/OT    # BPH   -c/w tamsulosin     # GERD: Continue PPI     DVT Ppx: lovenox  Full code  Diet: DASH

## 2018-07-14 NOTE — PROGRESS NOTE ADULT - SUBJECTIVE AND OBJECTIVE BOX
SUBJECTIVE:    Patient is a 48y old Male who presents with a chief complaint of Right sided facial numbness, weakness, and slurred speech (13 Jul 2018 13:56)    Currently admitted to medicine with the primary diagnosis of Cerebrovascular accident (CVA), unspecified mechanism     Today is hospital day 1d.   OVERNIGHT EVENTS  Today, patient reports that his numbness on right face and slurred speech have improved. Denies CP, SOB, weakness or numbness elsewhere.     PAST MEDICAL & SURGICAL HISTORY  Renal cancer, right  BPH (benign prostatic hyperplasia)  GERD (gastroesophageal reflux disease)  CVA (cerebral vascular accident)  H/O partial nephrectomy          ALLERGIES:  No Known Allergies    MEDICATIONS:  STANDING MEDICATIONS  aspirin enteric coated 162 milliGRAM(s) Oral daily  atorvastatin 80 milliGRAM(s) Oral at bedtime  enoxaparin Injectable 40 milliGRAM(s) SubCutaneous daily  pantoprazole    Tablet 40 milliGRAM(s) Oral before breakfast  sodium chloride 0.9%. 1000 milliLiter(s) IV Continuous <Continuous>  tamsulosin 0.4 milliGRAM(s) Oral at bedtime    PRN MEDICATIONS  acetaminophen   Tablet. 650 milliGRAM(s) Oral every 4 hours PRN    VITALS:   T(F): 97.1  HR: 47  BP: 117/63  RR: 18  SpO2: 98%          LABS:                        13.9   6.70  )-----------( 184      ( 13 Jul 2018 11:41 )             39.7     07-14    144  |  107  |  20  ----------------------------<  98  4.1   |  25  |  1.0    Ca    8.6      14 Jul 2018 06:09  Mg     2.0     07-14    TPro  7.2  /  Alb  4.7  /  TBili  1.0  /  DBili  x   /  AST  17  /  ALT  24  /  AlkPhos  70  07-13    PT/INR - ( 13 Jul 2018 11:41 )   PT: 11.40 sec;   INR: 1.06 ratio         PTT - ( 13 Jul 2018 11:41 )  PTT:35.6 sec      Troponin T, Serum: <0.01 ng/mL (07-13-18 @ 11:41)  Creatine Kinase, Serum: 82 U/L (07-13-18 @ 11:41)      CARDIAC MARKERS ( 13 Jul 2018 11:41 )  x     / <0.01 ng/mL / 82 U/L / x     / 1.2 ng/mL      RADIOLOGY:    PHYSICAL EXAM:  GEN: NAD  LUNGS: CTAB  HEART: RRR s1s2 present  ABD: soft nontender nondistended BS+  EXT: no edema   NEURO: aao x3, CN 2-12 intact, mild numbness on right cheek, sensory intact all extremities, motor 5/5 all extremities

## 2018-07-15 VITALS — DIASTOLIC BLOOD PRESSURE: 80 MMHG | SYSTOLIC BLOOD PRESSURE: 148 MMHG | RESPIRATION RATE: 20 BRPM | HEART RATE: 48 BPM

## 2018-07-15 RX ORDER — ATORVASTATIN CALCIUM 80 MG/1
1 TABLET, FILM COATED ORAL
Qty: 14 | Refills: 0 | OUTPATIENT
Start: 2018-07-15 | End: 2018-07-28

## 2018-07-15 RX ORDER — ASPIRIN/CALCIUM CARB/MAGNESIUM 324 MG
2 TABLET ORAL
Qty: 28 | Refills: 0 | OUTPATIENT
Start: 2018-07-15 | End: 2018-07-28

## 2018-07-15 RX ORDER — ASPIRIN/CALCIUM CARB/MAGNESIUM 324 MG
1 TABLET ORAL
Qty: 0 | Refills: 0 | COMMUNITY

## 2018-07-15 RX ORDER — ASPIRIN/CALCIUM CARB/MAGNESIUM 324 MG
1 TABLET ORAL
Qty: 14 | Refills: 0 | OUTPATIENT
Start: 2018-07-15 | End: 2018-07-28

## 2018-07-15 RX ADMIN — Medication 325 MILLIGRAM(S): at 12:23

## 2018-07-15 RX ADMIN — PANTOPRAZOLE SODIUM 40 MILLIGRAM(S): 20 TABLET, DELAYED RELEASE ORAL at 06:01

## 2018-07-15 RX ADMIN — SODIUM CHLORIDE 75 MILLILITER(S): 9 INJECTION INTRAMUSCULAR; INTRAVENOUS; SUBCUTANEOUS at 05:38

## 2018-07-15 NOTE — DISCHARGE NOTE ADULT - CARE PROVIDER_API CALL
Ayleen Andrade), Neurology; Vascular Neurology  [address] [place] [address] [id number]  Phone: [phone]  Fax: [phone]

## 2018-07-15 NOTE — PROGRESS NOTE ADULT - ATTENDING COMMENTS
48M PMHx CVA (2008) with no deficits, BPH, GERD, renal ca s/p R nephrectomy here with right facial droop, slurred speech; RUE numbness. MRI without acute stroke. Neg MRA/ CTA. D/c today on asa 162 per neuro, lipitor and f/u Dr. Andrade.

## 2018-07-15 NOTE — PROGRESS NOTE ADULT - SUBJECTIVE AND OBJECTIVE BOX
JOSÉ MIGUEL ANNE  48y  Male      Patient is a 48y old  Male who presents with a chief complaint of Right sided facial numbness, weakness, and slurred speech (15 Jul 2018 13:57)      INTERVAL HPI/OVERNIGHT EVENTS:  Numbness in RUE improved, no slurring of speech. No CP, SOB, ABD pain, diarrhea, fever.      Vital Signs Last 24 Hrs  T(C): 36.1 (15 Jul 2018 08:37), Max: 36.6 (14 Jul 2018 15:22)  T(F): 96.9 (15 Jul 2018 08:37), Max: 97.9 (14 Jul 2018 15:22)  HR: 48 (15 Jul 2018 12:20) (45 - 51)  BP: 148/80 (15 Jul 2018 12:20) (121/72 - 153/73)  BP(mean): --  RR: 20 (15 Jul 2018 12:20) (18 - 20)  SpO2: --    PHYSICAL EXAM:  GENERAL: NAD, well-groomed, well-developed  HEAD:  Atraumatic, Normocephalic    NERVOUS SYSTEM:  Alert & Oriented X3, Good concentration; Motor Strength 5/5 B/L upper and lower extremities;   CHEST/LUNG: Clear to auscultation bilaterally; No rales, rhonchi, wheezing, or rubs  HEART: Regular rate and rhythm; No murmurs, rubs, or gallops  ABDOMEN: Soft, Nontender, Nondistended; Bowel sounds present      Consultant(s) Notes Reviewed:  [x ] YES  [ ] NO  Care Discussed with Consultants/Other Providers [ x] YES  [ ] NO    LABS:    07-14    144  |  107  |  20  ----------------------------<  98  4.1   |  25  |  1.0    Ca    8.6      14 Jul 2018 06:09  Mg     2.0     07-14            CAPILLARY BLOOD GLUCOSE          RADIOLOGY & ADDITIONAL TESTS:    Imaging Personally Reviewed:  [ ] YES  [ ] NO JOSÉ MIGUEL ANNE  48y  Male    Patient is a 48y old  Male who presents with a chief complaint of Right sided facial numbness, weakness, and slurred speech (15 Jul 2018 13:57)    INTERVAL HPI/OVERNIGHT EVENTS:  Numbness in RUE improved, no slurring of speech. No CP, SOB, ABD pain, diarrhea, fever.    Vital Signs Last 24 Hrs  T(C): 36.1 (15 Jul 2018 08:37), Max: 36.6 (14 Jul 2018 15:22)  T(F): 96.9 (15 Jul 2018 08:37), Max: 97.9 (14 Jul 2018 15:22)  HR: 48 (15 Jul 2018 12:20) (45 - 51)  BP: 148/80 (15 Jul 2018 12:20) (121/72 - 153/73)  RR: 20 (15 Jul 2018 12:20) (18 - 20)    PHYSICAL EXAM:  GENERAL: NAD, well-groomed, well-developed  HEAD:  Atraumatic, Normocephalic    NERVOUS SYSTEM:  Alert & Oriented X3, Good concentration; Motor Strength 5/5 B/L upper and lower extremities;   CHEST/LUNG: Clear to auscultation bilaterally; No rales, rhonchi, wheezing, or rubs  HEART: Regular rate and rhythm; No murmurs, rubs, or gallops  ABDOMEN: Soft, Nontender, Nondistended; Bowel sounds present    Consultant(s) Notes Reviewed:  [x ] YES  [ ] NO  Care Discussed with Consultants/Other Providers [ x] YES  [ ] NO    LABS:    07-14    144  |  107  |  20  ----------------------------<  98  4.1   |  25  |  1.0    Ca    8.6      14 Jul 2018 06:09  Mg     2.0     07-14    Imaging Personally Reviewed:  x ] YES  [ ] NO

## 2018-07-15 NOTE — PROGRESS NOTE ADULT - SUBJECTIVE AND OBJECTIVE BOX
Neurology Progress Note    Interval History:      HPI:  48M with pmhx of CVA (no residual weakness ~10 years ago), BPH, GERD, Renal tumor stage III (unspecified exactly which) s/p R partial nephrectomy presents from home for Right sided facial numbness, weakness, and slurred speech. Patient awoke at 8am with these symptoms, wife last saw him normal at 2am the previous night. Patient had gone to the dermatologist to have mole removed. Symptoms didn't resolve and wife brought him in.  Patient states that he had similar symptoms two weeks ago on the left side of his face, though more pronounced then and associated with left hand 2 and 3rd digit numbness as well. Patient has noticed palpitations at that time but not during this current episode. Patient completed course of amoxicillin for URI, no residual symptoms or complications noted post treatment. Denies fever, chills, chest pain, SOB, weakness, numbness out side of R face, difficulty walking, blurred vision, headache, or n/v/d, abdominal pain.     In ED code stroke called, CTH negative, CTA done but pending, No TPA given due to outside of therapeutic window.     FMHx: brother with CVA at age 51. (13 Jul 2018 13:56)      PAST MEDICAL & SURGICAL HISTORY:  Renal cancer, right  BPH (benign prostatic hyperplasia)  GERD (gastroesophageal reflux disease)  CVA (cerebral vascular accident)  H/O partial nephrectomy          Vital Signs Last 24 Hrs  T(C): 36.1 (15 Jul 2018 08:37), Max: 36.6 (14 Jul 2018 15:22)  T(F): 96.9 (15 Jul 2018 08:37), Max: 97.9 (14 Jul 2018 15:22)  HR: 46 (15 Jul 2018 08:37) (45 - 55)  BP: 123/65 (15 Jul 2018 08:37) (121/72 - 153/73)  BP(mean): --  RR: 18 (15 Jul 2018 03:02) (16 - 18)  SpO2: 97% (14 Jul 2018 12:26) (97% - 97%)    Neurological Exam:   Mental status: Awake, alert and oriented x3.  Recent and remote memory intact.  Naming, repetition and comprehension intact.  Attention/concentration intact.  No dysarthria, no aphasia.  Fund of knowledge appropriate.    Cranial nerves: Pupils equally round and reactive to light, visual fields full, no nystagmus, extraocular muscles intact, V1 through V3 intact bilaterally and symmetric, face symmetric, hearing intact to finger rub, palate elevation symmetric, tongue was midline.  Motor:  MRC grading 5/5 b/l UE/LE.   strength 5/5.  Normal tone and bulk.  No abnormal movements.    Sensation: Intact to light touch, proprioception, and pinprick.   Coordination: No dysmetria on finger-to-nose and heel-to-shin.  No dysdiadokinesia.  Reflexes: 2+ in bilateral UE/LE, downgoing toes bilaterally. (-) [place].  Gait: Narrow and steady. No ataxia.  Romberg negative    acetaminophen   Tablet. 650 milliGRAM(s) Oral every 4 hours PRN  aspirin enteric coated 325 milliGRAM(s) Oral daily  atorvastatin 80 milliGRAM(s) Oral at bedtime  enoxaparin Injectable 40 milliGRAM(s) SubCutaneous daily  pantoprazole    Tablet 40 milliGRAM(s) Oral before breakfast  sodium chloride 0.9%. 1000 milliLiter(s) IV Continuous <Continuous>  tamsulosin 0.4 milliGRAM(s) Oral at bedtime      Labs:  CBC Full  -  ( 13 Jul 2018 11:41 )  WBC Count : 6.70 K/uL  Hemoglobin : 13.9 g/dL  Hematocrit : 39.7 %  Platelet Count - Automated : 184 K/uL  Mean Cell Volume : 83.1 fL  Mean Cell Hemoglobin : 29.1 pg  Mean Cell Hemoglobin Concentration : 35.0 g/dL  Auto Neutrophil # : 4.45 K/uL  Auto Lymphocyte # : 1.06 K/uL  Auto Monocyte # : 0.54 K/uL  Auto Eosinophil # : 0.59 K/uL  Auto Basophil # : 0.02 K/uL  Auto Neutrophil % : 66.4 %  Auto Lymphocyte % : 15.8 %  Auto Monocyte % : 8.1 %  Auto Eosinophil % : 8.8 %  Auto Basophil % : 0.3 %    07-14    144  |  107  |  20  ----------------------------<  98  4.1   |  25  |  1.0    Ca    8.6      14 Jul 2018 06:09  Mg     2.0     07-14    TPro  7.2  /  Alb  4.7  /  TBili  1.0  /  DBili  x   /  AST  17  /  ALT  24  /  AlkPhos  70  07-13    LIVER FUNCTIONS - ( 13 Jul 2018 11:41 )  Alb: 4.7 g/dL / Pro: 7.2 g/dL / ALK PHOS: 70 U/L / ALT: 24 U/L / AST: 17 U/L / GGT: x           PT/INR - ( 13 Jul 2018 11:41 )   PT: 11.40 sec;   INR: 1.06 ratio         PTT - ( 13 Jul 2018 11:41 )  PTT:35.6 sec        Assessment:  This is a 48y Male w/ h/o < from: MR Head No Cont (07.14.18 @ 16:17) >  IMPRESSION:     1.  No evidence of recent infarct or acute intracranial hemorrhage.    2.  Scattered small foci white matter signal abnormality, nonspecific in   etiology.    RUMA SARMIENTO M.D., ATTENDING RADIOLOGIST  This document has been electronically signed. Jul 15 2018  9:33AM      < end of copied text >    < from: MR Angio Neck No Cont (07.14.18 @ 16:16) >  IMPRESSION:    Unremarkable MRA of the neck.    RUMA SARMIENTO M.D., ATTENDING RADIOLOGIST  This document has been electronically signed. Jul 15 2018  9:32AM     end of copied text >    < from: Transthoracic Echocardiogram (07.14.18 @ 10:18) >  Summary:   1. LV EjectionFraction by Slaughter's Method with a biplane EF of 67 %.   2. Normal left ventricular size and wall thicknesses, with normal   systolic function.   3. Spectral Doppler shows impaired relaxation pattern of left   ventricular myocardial filling (Grade Idiastolic dysfunction).   4. Mild tricuspid regurgitation.    < end of copied text >

## 2018-07-15 NOTE — DISCHARGE NOTE ADULT - PATIENT PORTAL LINK FT
You can access the [facility] Patient Portal, offered by [facility], by registering with the following website: [email or web address]

## 2018-07-15 NOTE — PROGRESS NOTE ADULT - ASSESSMENT
Attending co-signer Attestation:  Pt seen and examined this am. Neurology notes reviewed. Dr. Hopson's notes and plan reviewed. Agree with current plan.   Plan: D/C Home with full dose of ASA and high intensity statin and f/u with outpt Neurologist soon. Resumed all other home meds. Stroke education provided.

## 2018-07-15 NOTE — DISCHARGE NOTE ADULT - MEDICATION SUMMARY - MEDICATIONS TO TAKE
I will START or STAY ON the medications listed below when I get home from the hospital:    aspirin 325 mg oral delayed release tablet  -- 1 tab(s) by mouth once a day  -- Indication: For stroke    alfuzosin 10 mg oral tablet, extended release  -- 1 tab(s) by mouth once a day  -- Indication: For BPH (benign prostatic hyperplasia)    atorvastatin 80 mg oral tablet  -- 1 tab(s) by mouth once a day (at bedtime)  -- Indication: For stroke    NexIUM 40 mg oral delayed release capsule  -- 1 cap(s) by mouth once a day  -- Indication: For GERD (gastroesophageal reflux disease)    Vitamin C 25 mg oral tablet, chewable  -- 1 tab(s) by mouth once a day  -- Indication: For GEneral health    Vitamin D3 1000 intl units oral tablet  -- 1 tab(s) by mouth once a day  -- Indication: For GEneral health I will START or STAY ON the medications listed below when I get home from the hospital:    aspirin 81 mg oral tablet  -- 2 tab(s) by mouth once a day   -- Take with food or milk.    -- Indication: For stroke    alfuzosin 10 mg oral tablet, extended release  -- 1 tab(s) by mouth once a day  -- Indication: For BPH (benign prostatic hyperplasia)    atorvastatin 80 mg oral tablet  -- 1 tab(s) by mouth once a day (at bedtime)  -- Indication: For stroke    NexIUM 40 mg oral delayed release capsule  -- 1 cap(s) by mouth once a day  -- Indication: For GERD (gastroesophageal reflux disease)    Vitamin C 25 mg oral tablet, chewable  -- 1 tab(s) by mouth once a day  -- Indication: For GEneral health    Vitamin D3 1000 intl units oral tablet  -- 1 tab(s) by mouth once a day  -- Indication: For GEneral health

## 2018-07-15 NOTE — PROGRESS NOTE ADULT - ASSESSMENT
48 year old gentleman with a past medical history of ischemic stroke, presented with right facial parasthesias, dysarthria and right nasolabial flattening resolving with negative w/u.  possible TIA.      plan   mg daily.  Lipitor 80 mg QHS.  outpt f/u in 2 wks  consider loop recorder as outpt  f/u hypercoagulable labs as outpt 48 year old gentleman with a past medical history of ischemic stroke, presented with right facial parasthesias, dysarthria and right nasolabial flattening resolving with negative w/u.  possible TIA.      plan   mg daily.  Lipitor 80 mg QHS.  outpt f/u w/ Dr. Andrade in 2 wks  consider loop recorder as outpt  f/u hypercoagulable labs as outpt

## 2018-07-15 NOTE — DISCHARGE NOTE ADULT - HOSPITAL COURSE
d    48M with pmhx of CVA (no residual weakness ~10 years ago), BPH, GERD, Renal tumor stage III (unspecified exactly which) s/p R partial nephrectomy presents from home for Right sided facial numbness, weakness, and slurred speech. Patient awoke at 8am with these symptoms, wife last saw him normal at 2am the previous night. Patient had gone to the dermatologist to have mole removed. Symptoms didn't resolve and wife brought him in.  Patient states that he had similar symptoms two weeks ago on the left side of his face, though more pronounced then and associated with left hand 2 and 3rd digit numbness as well. Patient has noticed palpitations at that time but not during this current episode. Patient completed course of amoxicillin for URI, no residual symptoms or complications noted post treatment. Denies fever, chills, chest pain, SOB, weakness, numbness out side of R face, difficulty walking, blurred vision, headache, or n/v/d, abdominal pain.     In ED code stroke called, CTH negative, CTA done but pending, No TPA given due to outside of therapeutic window.     FMHx: brother with CVA at age 51. 48M PMHx CVA 2008 with no residual weakness, BPH, GERD, renal tumor s/p right nephrectomy presented with R facial numbness, weakness, slurred speech. Presented with code stroke but outside TPA window. Initial CTH neg. Admitted to stroke unit, started on asa 325 and lipitor per neuro. MRI demonstrated no acute infarct. Clinically pt improved in his deficits. Discharged with plan to follow up with Dr. Andrade in two weeks.

## 2018-07-15 NOTE — DISCHARGE NOTE ADULT - CARE PLAN
Principal Discharge DX:	Cerebrovascular accident (CVA), unspecified mechanism  Goal:	Please follow up with Dr. Andrade in two weeks  Assessment and plan of treatment:	You were ruled out for acute stroke. Please take medications as listed above and follow up with the neurologist listed above in one week.

## 2018-07-20 DIAGNOSIS — Z83.3 FAMILY HISTORY OF DIABETES MELLITUS: ICD-10-CM

## 2018-07-20 DIAGNOSIS — Z82.3 FAMILY HISTORY OF STROKE: ICD-10-CM

## 2018-07-20 DIAGNOSIS — K21.9 GASTRO-ESOPHAGEAL REFLUX DISEASE WITHOUT ESOPHAGITIS: ICD-10-CM

## 2018-07-20 DIAGNOSIS — G45.9 TRANSIENT CEREBRAL ISCHEMIC ATTACK, UNSPECIFIED: ICD-10-CM

## 2018-07-20 DIAGNOSIS — Z90.5 ACQUIRED ABSENCE OF KIDNEY: ICD-10-CM

## 2018-07-20 DIAGNOSIS — Z79.82 LONG TERM (CURRENT) USE OF ASPIRIN: ICD-10-CM

## 2018-07-20 DIAGNOSIS — R47.1 DYSARTHRIA AND ANARTHRIA: ICD-10-CM

## 2018-07-20 DIAGNOSIS — Z86.73 PERSONAL HISTORY OF TRANSIENT ISCHEMIC ATTACK (TIA), AND CEREBRAL INFARCTION WITHOUT RESIDUAL DEFICITS: ICD-10-CM

## 2018-07-20 DIAGNOSIS — N40.0 BENIGN PROSTATIC HYPERPLASIA WITHOUT LOWER URINARY TRACT SYMPTOMS: ICD-10-CM

## 2018-07-20 DIAGNOSIS — R29.810 FACIAL WEAKNESS: ICD-10-CM

## 2018-07-20 DIAGNOSIS — Z85.528 PERSONAL HISTORY OF OTHER MALIGNANT NEOPLASM OF KIDNEY: ICD-10-CM

## 2018-07-20 DIAGNOSIS — I63.9 CEREBRAL INFARCTION, UNSPECIFIED: ICD-10-CM

## 2018-11-01 NOTE — STROKE CODE NOTE - NS AS STROKE CODE PATIENT KNOWNTIME
Known
Ortho Progress Note    Subjective:  73y Female w L prox hum fx after fall . Patient seen and examined, arm in sling, doing well, pain endorsed but controlled. No acute events overnight. Denies CP/SOB/N/V      Objective:    Vital Signs Last 24 Hrs  T(C): 37.1 (01 Nov 2018 09:00), Max: 37.8 (01 Nov 2018 05:27)  T(F): 98.8 (01 Nov 2018 09:00), Max: 100.1 (01 Nov 2018 05:27)  HR: 78 (01 Nov 2018 10:58) (70 - 78)  BP: 147/58 (01 Nov 2018 10:58) (111/64 - 169/77)  BP(mean): 84 (01 Nov 2018 10:58) (84 - 110)  RR: 16 (01 Nov 2018 10:58) (16 - 20)  SpO2: 93% (01 Nov 2018 10:58) (93% - 97%)    NAD, AOx3, comfortable      LUE  Arm held in sling, mild bruising over deltoid  Motor: 5/5 wrist flexion, extension/, palmar intrinsics/bicep/tricep/delt  Sensation: SILT med/uln/rad/musc/axillary  Pulses:  rad/brach 2+ , fingers/hand WWP                            11.6   6.8   )-----------( 191      ( 01 Nov 2018 12:39 )             35.8         A/P:  73y Female s/p fall w L prox humerus fx  -non operative, pt to be treated conservatively with arm immobilized in sling  -Pain Control  -OT - NWB in sling  -care per primary team      Oziel Seals MD, PGY-2  Ortho Pager: [phone]
Known

## 2019-12-10 PROBLEM — Z00.00 ENCOUNTER FOR PREVENTIVE HEALTH EXAMINATION: Status: ACTIVE | Noted: 2019-12-10

## 2023-02-01 ENCOUNTER — INPATIENT (INPATIENT)
Facility: HOSPITAL | Age: 53
LOS: 1 days | Discharge: HOME | End: 2023-02-03
Attending: INTERNAL MEDICINE | Admitting: INTERNAL MEDICINE
Payer: COMMERCIAL

## 2023-02-01 VITALS
WEIGHT: 205.03 LBS | RESPIRATION RATE: 18 BRPM | HEIGHT: 68 IN | HEART RATE: 54 BPM | DIASTOLIC BLOOD PRESSURE: 77 MMHG | OXYGEN SATURATION: 100 % | TEMPERATURE: 99 F | SYSTOLIC BLOOD PRESSURE: 163 MMHG

## 2023-02-01 DIAGNOSIS — Z90.5 ACQUIRED ABSENCE OF KIDNEY: Chronic | ICD-10-CM

## 2023-02-01 PROBLEM — C64.1 MALIGNANT NEOPLASM OF RIGHT KIDNEY, EXCEPT RENAL PELVIS: Chronic | Status: ACTIVE | Noted: 2018-07-13

## 2023-02-01 PROBLEM — N40.0 BENIGN PROSTATIC HYPERPLASIA WITHOUT LOWER URINARY TRACT SYMPTOMS: Chronic | Status: ACTIVE | Noted: 2018-07-13

## 2023-02-01 PROBLEM — K21.9 GASTRO-ESOPHAGEAL REFLUX DISEASE WITHOUT ESOPHAGITIS: Chronic | Status: ACTIVE | Noted: 2018-07-13

## 2023-02-01 PROBLEM — I63.9 CEREBRAL INFARCTION, UNSPECIFIED: Chronic | Status: ACTIVE | Noted: 2018-07-13

## 2023-02-01 LAB
ALBUMIN SERPL ELPH-MCNC: 4.4 G/DL — SIGNIFICANT CHANGE UP (ref 3.5–5.2)
ALP SERPL-CCNC: 73 U/L — SIGNIFICANT CHANGE UP (ref 30–115)
ALT FLD-CCNC: 28 U/L — SIGNIFICANT CHANGE UP (ref 0–41)
ANION GAP SERPL CALC-SCNC: 7 MMOL/L — SIGNIFICANT CHANGE UP (ref 7–14)
AST SERPL-CCNC: 19 U/L — SIGNIFICANT CHANGE UP (ref 0–41)
BASOPHILS # BLD AUTO: 0.04 K/UL — SIGNIFICANT CHANGE UP (ref 0–0.2)
BASOPHILS NFR BLD AUTO: 0.7 % — SIGNIFICANT CHANGE UP (ref 0–1)
BILIRUB DIRECT SERPL-MCNC: 0.2 MG/DL — SIGNIFICANT CHANGE UP (ref 0–0.3)
BILIRUB INDIRECT FLD-MCNC: 0.6 MG/DL — SIGNIFICANT CHANGE UP (ref 0.2–1.2)
BILIRUB SERPL-MCNC: 0.8 MG/DL — SIGNIFICANT CHANGE UP (ref 0.2–1.2)
BLD GP AB SCN SERPL QL: SIGNIFICANT CHANGE UP
BUN SERPL-MCNC: 20 MG/DL — SIGNIFICANT CHANGE UP (ref 10–20)
CALCIUM SERPL-MCNC: 9.3 MG/DL — SIGNIFICANT CHANGE UP (ref 8.4–10.5)
CHLORIDE SERPL-SCNC: 107 MMOL/L — SIGNIFICANT CHANGE UP (ref 98–110)
CO2 SERPL-SCNC: 27 MMOL/L — SIGNIFICANT CHANGE UP (ref 17–32)
CREAT SERPL-MCNC: 1.1 MG/DL — SIGNIFICANT CHANGE UP (ref 0.7–1.5)
EGFR: 80 ML/MIN/1.73M2 — SIGNIFICANT CHANGE UP
EOSINOPHIL # BLD AUTO: 0.26 K/UL — SIGNIFICANT CHANGE UP (ref 0–0.7)
EOSINOPHIL NFR BLD AUTO: 4.7 % — SIGNIFICANT CHANGE UP (ref 0–8)
GLUCOSE SERPL-MCNC: 100 MG/DL — HIGH (ref 70–99)
HCT VFR BLD CALC: 36.5 % — LOW (ref 42–52)
HCT VFR BLD CALC: 37.7 % — LOW (ref 42–52)
HGB BLD-MCNC: 12.9 G/DL — LOW (ref 14–18)
HGB BLD-MCNC: 13.2 G/DL — LOW (ref 14–18)
IMM GRANULOCYTES NFR BLD AUTO: 0.5 % — HIGH (ref 0.1–0.3)
INR BLD: 1.01 RATIO — SIGNIFICANT CHANGE UP (ref 0.65–1.3)
LIDOCAIN IGE QN: 25 U/L — SIGNIFICANT CHANGE UP (ref 7–60)
LYMPHOCYTES # BLD AUTO: 0.95 K/UL — LOW (ref 1.2–3.4)
LYMPHOCYTES # BLD AUTO: 17.3 % — LOW (ref 20.5–51.1)
MCHC RBC-ENTMCNC: 28.9 PG — SIGNIFICANT CHANGE UP (ref 27–31)
MCHC RBC-ENTMCNC: 29.3 PG — SIGNIFICANT CHANGE UP (ref 27–31)
MCHC RBC-ENTMCNC: 35 G/DL — SIGNIFICANT CHANGE UP (ref 32–37)
MCHC RBC-ENTMCNC: 35.3 G/DL — SIGNIFICANT CHANGE UP (ref 32–37)
MCV RBC AUTO: 82.5 FL — SIGNIFICANT CHANGE UP (ref 80–94)
MCV RBC AUTO: 83 FL — SIGNIFICANT CHANGE UP (ref 80–94)
MONOCYTES # BLD AUTO: 0.4 K/UL — SIGNIFICANT CHANGE UP (ref 0.1–0.6)
MONOCYTES NFR BLD AUTO: 7.3 % — SIGNIFICANT CHANGE UP (ref 1.7–9.3)
NEUTROPHILS # BLD AUTO: 3.82 K/UL — SIGNIFICANT CHANGE UP (ref 1.4–6.5)
NEUTROPHILS NFR BLD AUTO: 69.5 % — SIGNIFICANT CHANGE UP (ref 42.2–75.2)
NRBC # BLD: 0 /100 WBCS — SIGNIFICANT CHANGE UP (ref 0–0)
NRBC # BLD: 0 /100 WBCS — SIGNIFICANT CHANGE UP (ref 0–0)
PLATELET # BLD AUTO: 152 K/UL — SIGNIFICANT CHANGE UP (ref 130–400)
PLATELET # BLD AUTO: 159 K/UL — SIGNIFICANT CHANGE UP (ref 130–400)
POTASSIUM SERPL-MCNC: 4.9 MMOL/L — SIGNIFICANT CHANGE UP (ref 3.5–5)
POTASSIUM SERPL-SCNC: 4.9 MMOL/L — SIGNIFICANT CHANGE UP (ref 3.5–5)
PROT SERPL-MCNC: 6.6 G/DL — SIGNIFICANT CHANGE UP (ref 6–8)
PROTHROM AB SERPL-ACNC: 11.5 SEC — SIGNIFICANT CHANGE UP (ref 9.95–12.87)
RBC # BLD: 4.4 M/UL — LOW (ref 4.7–6.1)
RBC # BLD: 4.57 M/UL — LOW (ref 4.7–6.1)
RBC # FLD: 12.8 % — SIGNIFICANT CHANGE UP (ref 11.5–14.5)
RBC # FLD: 13 % — SIGNIFICANT CHANGE UP (ref 11.5–14.5)
SARS-COV-2 RNA SPEC QL NAA+PROBE: SIGNIFICANT CHANGE UP
SODIUM SERPL-SCNC: 141 MMOL/L — SIGNIFICANT CHANGE UP (ref 135–146)
TROPONIN T SERPL-MCNC: <0.01 NG/ML — SIGNIFICANT CHANGE UP
WBC # BLD: 5.5 K/UL — SIGNIFICANT CHANGE UP (ref 4.8–10.8)
WBC # BLD: 5.92 K/UL — SIGNIFICANT CHANGE UP (ref 4.8–10.8)
WBC # FLD AUTO: 5.5 K/UL — SIGNIFICANT CHANGE UP (ref 4.8–10.8)
WBC # FLD AUTO: 5.92 K/UL — SIGNIFICANT CHANGE UP (ref 4.8–10.8)

## 2023-02-01 PROCEDURE — 93010 ELECTROCARDIOGRAM REPORT: CPT

## 2023-02-01 PROCEDURE — 99285 EMERGENCY DEPT VISIT HI MDM: CPT

## 2023-02-01 RX ORDER — PANTOPRAZOLE SODIUM 20 MG/1
40 TABLET, DELAYED RELEASE ORAL
Refills: 0 | Status: DISCONTINUED | OUTPATIENT
Start: 2023-02-01 | End: 2023-02-03

## 2023-02-01 RX ORDER — TADALAFIL 10 MG/1
1 TABLET, FILM COATED ORAL
Qty: 0 | Refills: 0 | DISCHARGE

## 2023-02-01 RX ORDER — INFLUENZA VIRUS VACCINE 15; 15; 15; 15 UG/.5ML; UG/.5ML; UG/.5ML; UG/.5ML
0.5 SUSPENSION INTRAMUSCULAR ONCE
Refills: 0 | Status: DISCONTINUED | OUTPATIENT
Start: 2023-02-01 | End: 2023-02-03

## 2023-02-01 RX ORDER — PREGABALIN 225 MG/1
1000 CAPSULE ORAL DAILY
Refills: 0 | Status: DISCONTINUED | OUTPATIENT
Start: 2023-02-01 | End: 2023-02-03

## 2023-02-01 RX ORDER — ASPIRIN/CALCIUM CARB/MAGNESIUM 324 MG
81 TABLET ORAL
Refills: 0 | Status: DISCONTINUED | OUTPATIENT
Start: 2023-02-01 | End: 2023-02-03

## 2023-02-01 RX ORDER — ENOXAPARIN SODIUM 100 MG/ML
40 INJECTION SUBCUTANEOUS EVERY 24 HOURS
Refills: 0 | Status: DISCONTINUED | OUTPATIENT
Start: 2023-02-01 | End: 2023-02-01

## 2023-02-01 RX ORDER — FAMOTIDINE 10 MG/ML
40 INJECTION INTRAVENOUS AT BEDTIME
Refills: 0 | Status: DISCONTINUED | OUTPATIENT
Start: 2023-02-01 | End: 2023-02-03

## 2023-02-01 RX ORDER — TAMSULOSIN HYDROCHLORIDE 0.4 MG/1
0.8 CAPSULE ORAL DAILY
Refills: 0 | Status: DISCONTINUED | OUTPATIENT
Start: 2023-02-01 | End: 2023-02-03

## 2023-02-01 RX ORDER — FAMOTIDINE 10 MG/ML
1 INJECTION INTRAVENOUS
Qty: 0 | Refills: 0 | DISCHARGE

## 2023-02-01 RX ORDER — ASCORBIC ACID 60 MG
500 TABLET,CHEWABLE ORAL DAILY
Refills: 0 | Status: DISCONTINUED | OUTPATIENT
Start: 2023-02-01 | End: 2023-02-03

## 2023-02-01 RX ADMIN — FAMOTIDINE 40 MILLIGRAM(S): 10 INJECTION INTRAVENOUS at 23:52

## 2023-02-01 NOTE — ED PROVIDER NOTE - OBJECTIVE STATEMENT
52-year-old male past medical history of BPH, internal hemorrhoids followed by Dr. Wang at [facility] presents for bright red blood per rectum.  Per patient for many years he has had multiple episodes per week of scant bleeding on the tissue paper when he wipes.  However today he had more blood in the bowl and on the tissue paper and he used nearly entire roll of toilet paper before it stopped.  Patient presented with a towel that did not have blood.  However ROLAN on exam was positive with palpated internal hemorrhoids.

## 2023-02-01 NOTE — ED PROVIDER NOTE - PHYSICAL EXAMINATION
CONSTITUTIONAL: nontoxic appearing, in no acute distress  HEAD:  normocephalic, atraumatic  EYES:  no conjunctival injection, no eye discharge, tracking well  ENT:  tympanic membranes intact bilaterally, moist mucous membranes, no oropharyngeal ulcerations or lesions, no tonsillar swelling or erythema, no tonsillar exudates  NECK:  supple, no masses, no tender anterior/posterior cervical lymphadenopathy  CV:  regular rate and rhythm, cap refill < 2 seconds  RESP:  normal respiratory effort, lungs clear to auscultation bilaterally, no wheezes, no crackles, no retractions, no stridor  ABD:  soft, nontender, nondistended, no masses, no organomegaly. +ROLAN  LYMPH:  no significant lymphadenopathy  MSK/NEURO:  normal movement, normal tone  SKIN:  warm, dry, no rash

## 2023-02-01 NOTE — H&P ADULT - NSHPLABSRESULTS_GEN_ALL_CORE
Vital Signs Last 24 Hrs  T(C): 37.1 (01 Feb 2023 11:30), Max: 37.1 (01 Feb 2023 11:30)  T(F): 98.8 (01 Feb 2023 11:30), Max: 98.8 (01 Feb 2023 11:30)  HR: 54 (01 Feb 2023 11:30) (54 - 54)  BP: 163/77 (01 Feb 2023 11:30) (163/77 - 163/77)  BP(mean): --  RR: 18 (01 Feb 2023 11:30) (18 - 18)  SpO2: 100% (01 Feb 2023 11:30) (100% - 100%)    Parameters below as of 01 Feb 2023 11:30  Patient On (Oxygen Delivery Method): room air    ROLAN was positive for gross red blood and palpable internal hemorrhoids.                          12.9   5.50  )-----------( 152      ( 01 Feb 2023 13:24 )             36.5     02-01    141  |  107  |  20  ----------------------------<  100<H>  4.9   |  27  |  1.1    Ca    9.3      01 Feb 2023 13:24    TPro  6.6  /  Alb  4.4  /  TBili  0.8  /  DBili  0.2  /  AST  19  /  ALT  28  /  AlkPhos  73  02-01    negative troponin

## 2023-02-01 NOTE — H&P ADULT - NSHPSOCIALHISTORY_GEN_ALL_CORE
with two adult children and lives on [place] with his wife. Still working as a [employer] .

## 2023-02-01 NOTE — H&P ADULT - ATTENDING COMMENTS
Patient is a 53yo M w/ PMH of CVA, BPH, GERD, Diverticulosis, Internal Hemorrhoids who presents with bright red blood per rectum that began the morning of presentation.    #Rectal bleeding likely 2/2 internal hemorrhoids  - currently hemodynamically stable  - ROLAN positive for fresh blood in ED, hemorrhoids palpated  - H/H 12.9/36.5  - type and screen completed in ED on 2/1/23  - EGD and colonoscopy in Fall 2022 that showed diverticulosis and internal hemorrhoids per patient (Dr. Wang)   - trend CBC q12h for now  - f/u PT/PTT/INR  - PPI daily  - will make NPO after midnight on admission, please reinstate diet orders if/when cleared by GI  - will not start lovenox until cleared by GI  - GI consult    #Hx of CVA  - c/w home medication aspirin 81mg BID    #BPH  - c/w home medication Alfuzosin 10mg daily  - c/w home medication tadalafil 5mg daily    #GERD  - c/w home medication esomeprazole 40mg am daily  - c/w home medication famotidine 40 mg at bedtime    DVT Prophylaxis: Lovenox  Diet: regular  Dispo: home    Dr. Matias Pham  Attending Hospitalist

## 2023-02-01 NOTE — ED PROVIDER NOTE - CLINICAL SUMMARY MEDICAL DECISION MAKING FREE TEXT BOX
Patient with rectal bleeding, on aspirin, history of diverticulosis, stable labs, will admit to medicine with GI evaluation.  Dr. Wang's office did not provide any additional recommendations.  Labs and EKG were ordered and reviewed.  Imaging was ordered and reviewed by me.  Appropriate medications for patient's presenting complaints were ordered and effects were reassessed.  Patient's records (prior hospital, ED visit, and/or nursing home notes if available) were reviewed.  Additional history was obtained from EMS, family, and/or PCP (where available).  Escalation to admission/observation was considered.  ) Patient requires inpatient hospitalization - monitored setting.

## 2023-02-01 NOTE — H&P ADULT - NSHPPHYSICALEXAM_GEN_ALL_CORE
T(C): 37.1 (02-01-23 @ 11:30), Max: 37.1 (02-01-23 @ 11:30)  HR: 54 (02-01-23 @ 11:30) (54 - 54)  BP: 163/77 (02-01-23 @ 11:30) (163/77 - 163/77)  RR: 18 (02-01-23 @ 11:30) (18 - 18)  SpO2: 100% (02-01-23 @ 11:30) (100% - 100%)    CONSTITUTIONAL: Well groomed, sitting up in chair, no apparent distress, wife at side  EYES: PERRLA and symmetric, EOMI, No conjunctival or scleral injection, non-icteric  ENMT: Oral mucosa with moist membranes. Normal dentition; no pharyngeal injection or exudates             NECK: Supple, symmetric and without tracheal deviation   RESP: No respiratory distress, no use of accessory muscles; CTA b/l, no WRR  CV: RRR, +S1S2, no MRG; no JVD; no peripheral edema  GI: Soft, NT, ND, no rebound, no guarding; no palpable masses; no hepatosplenomegaly; no hernia palpated; on ROLAN, bright red blood per rectum  LYMPH: No cervical LAD or tenderness; no axillary LAD or tenderness; no inguinal LAD or tenderness  MSK: Did not observe gait, No digital clubbing or cyanosis; examination of the (head/neck/spine/ribs/pelvis, RUE, LUE, RLE, LLE) without misalignment,            Normal ROM without pain, no spinal tenderness, normal muscle strength/tone  SKIN: No rashes or ulcers noted; no subcutaneous nodules or induration palpable  NEURO: CN II-XII intact; normal reflexes in upper and lower extremities, sensation intact in upper and lower extremities b/l to light touch   PSYCH: Appropriate insight/judgment; A+O x 3, mood and affect appropriate, recent/remote memory intact

## 2023-02-01 NOTE — ED PROVIDER NOTE - PROGRESS NOTE DETAILS
Spoke with Dr. Wang's associate Dr. Bucio was not in office.  States that they will not be able to do anything in office even if they have an appointment tomorrow.  Recommends admission.

## 2023-02-01 NOTE — ED PROVIDER NOTE - ATTENDING CONTRIBUTION TO CARE
52-year-old male with past medical history of hyperlipidemia, CVA, on aspirin, BPH, diverticulosis, presents with rectal bleeding for the last 2 days.  Patient says more severe today.  Patient denies any abdominal pain.  Patient has seen Dr. Wang in September 2022 for EGD and colonoscopy.  Patient was found to have diverticulosis only.  Patient denies any nausea, vomiting, diarrhea.  Patient denies any fever or chills.  Patient denies any fatigue, shortness of breath, chest pain.  Exam: nad, ncat, perrl, eomi, mmm, rrr, ctab, abd soft, nt, nd aox3, ROLAN as per Dr. Rice.  Impression: Patient with rectal bleeding, positive ROLAN, will check labs, contact Dr. Wang

## 2023-02-01 NOTE — ED PROVIDER NOTE - CONSIDERATION OF ADMISSION OBSERVATION
Patient with rectal bleeding, on aspirin, history of diverticulosis, stable labs, will admit to medicine with GI evaluation.  Dr. Wang's office did not provide any additional recommendations.  Labs and EKG were ordered and reviewed.  Imaging was ordered and reviewed by me.  Appropriate medications for patient's presenting complaints were ordered and effects were reassessed.  Patient's records (prior hospital, ED visit, and/or nursing home notes if available) were reviewed.  Additional history was obtained from EMS, family, and/or PCP (where available).  Escalation to admission/observation was considered.  ) Patient requires inpatient hospitalization - monitored setting. Consideration of Admission/Observation

## 2023-02-01 NOTE — H&P ADULT - ASSESSMENT
Patient is a 51yo M w/ PMH of CVA, BPH, GERD, Diverticulosis, Internal Hemorrhoids who presents with bright red blood per rectum that began the morning of presentation.    #Rectal bleeding likely 2/2 internal hemorrhoids  - currently hemodynamically stable  - ROLAN positive for fresh blood in ED, hemorrhoids palpated  - H/H 12.9/36.5  - type and screen completed in ED on 2/1/23  - EGD and colonoscopy in Fall 2022 that showed diverticulosis and internal hemorrhoids per patient (Dr. Wang)   - trend CBC  - will make NPO after midnight on admission, please reinstate diet orders if/when cleared by GI  - GI consult    #Hx of CVA  - c/w home medication aspirin 81mg BID    #BPH  - c/w home medication Alfuzosin 10mg daily  - c/w home medication tadalafil 5mg daily    #GERD  - c/w home medication esomeprazole 40mg am daily  - c/w home medication famotidine 40 mg at bedtime    DVT Prophylaxis: Lovenox  Diet: regular  Dispo: home     Patient is a 51yo M w/ PMH of CVA, BPH, GERD, Diverticulosis, Internal Hemorrhoids who presents with bright red blood per rectum that began the morning of presentation.    #Rectal bleeding likely 2/2 internal hemorrhoids  - currently hemodynamically stable  - ROLAN positive for fresh blood in ED, hemorrhoids palpated  - H/H 12.9/36.5  - type and screen completed in ED on 2/1/23  - EGD and colonoscopy in Fall 2022 that showed diverticulosis and internal hemorrhoids per patient (Dr. Wang)   - trend CBC  - f/u PT/PTT/INR  - will make NPO after midnight on admission, please reinstate diet orders if/when cleared by GI  - will not start lovenox until cleared by GI  - GI consult    #Hx of CVA  - c/w home medication aspirin 81mg BID    #BPH  - c/w home medication Alfuzosin 10mg daily  - c/w home medication tadalafil 5mg daily    #GERD  - c/w home medication esomeprazole 40mg am daily  - c/w home medication famotidine 40 mg at bedtime    DVT Prophylaxis: Lovenox  Diet: regular  Dispo: home     Patient is a 53yo M w/ PMH of CVA, BPH, GERD, Diverticulosis, Internal Hemorrhoids who presents with bright red blood per rectum that began the morning of presentation.    #Rectal bleeding likely 2/2 internal hemorrhoids  - currently hemodynamically stable  - ROLAN positive for fresh blood in ED, hemorrhoids palpated  - H/H 12.9/36.5  - type and screen completed in ED on 2/1/23  - EGD and colonoscopy in Fall 2022 that showed diverticulosis and internal hemorrhoids per patient (Dr. Wang)   - trend CBC q12h for now  - f/u PT/PTT/INR  - PPI daily  - will make NPO after midnight on admission, please reinstate diet orders if/when cleared by GI  - will not start lovenox until cleared by GI  - GI consult    #Hx of CVA  - c/w home medication aspirin 81mg BID    #BPH  - c/w home medication Alfuzosin 10mg daily  - c/w home medication tadalafil 5mg daily    #GERD  - c/w home medication esomeprazole 40mg am daily  - c/w home medication famotidine 40 mg at bedtime    DVT Prophylaxis: Lovenox  Diet: regular  Dispo: home    Dr. Matias Pham  Attending Hospitalist

## 2023-02-01 NOTE — PATIENT PROFILE ADULT - FALL HARM RISK - HARM RISK INTERVENTIONS

## 2023-02-01 NOTE — ED PROVIDER NOTE - NSICDXPASTMEDICALHX_GEN_ALL_CORE_FT
PAST MEDICAL HISTORY:  BPH (benign prostatic hyperplasia)     CVA (cerebral vascular accident)     GERD (gastroesophageal reflux disease)     Renal cancer, right

## 2023-02-01 NOTE — ED PROVIDER NOTE - NSICDXFAMILYHX_GEN_ALL_CORE_FT
FAMILY HISTORY:  Father  Still living? No  Family history of diabetes mellitus in father, Age at diagnosis: Age Unknown    Mother  Still living? Unknown  Family history of thyroid dysfunction, Age at diagnosis: Age Unknown

## 2023-02-02 LAB
ANION GAP SERPL CALC-SCNC: 6 MMOL/L — LOW (ref 7–14)
BUN SERPL-MCNC: 23 MG/DL — HIGH (ref 10–20)
CALCIUM SERPL-MCNC: 9 MG/DL — SIGNIFICANT CHANGE UP (ref 8.4–10.4)
CHLORIDE SERPL-SCNC: 105 MMOL/L — SIGNIFICANT CHANGE UP (ref 98–110)
CO2 SERPL-SCNC: 30 MMOL/L — SIGNIFICANT CHANGE UP (ref 17–32)
CREAT SERPL-MCNC: 1.2 MG/DL — SIGNIFICANT CHANGE UP (ref 0.7–1.5)
EGFR: 72 ML/MIN/1.73M2 — SIGNIFICANT CHANGE UP
GLUCOSE SERPL-MCNC: 102 MG/DL — HIGH (ref 70–99)
HCT VFR BLD CALC: 36.3 % — LOW (ref 42–52)
HGB BLD-MCNC: 12.8 G/DL — LOW (ref 14–18)
MCHC RBC-ENTMCNC: 29.4 PG — SIGNIFICANT CHANGE UP (ref 27–31)
MCHC RBC-ENTMCNC: 35.3 G/DL — SIGNIFICANT CHANGE UP (ref 32–37)
MCV RBC AUTO: 83.3 FL — SIGNIFICANT CHANGE UP (ref 80–94)
NRBC # BLD: 0 /100 WBCS — SIGNIFICANT CHANGE UP (ref 0–0)
PLATELET # BLD AUTO: 148 K/UL — SIGNIFICANT CHANGE UP (ref 130–400)
POTASSIUM SERPL-MCNC: 3.7 MMOL/L — SIGNIFICANT CHANGE UP (ref 3.5–5)
POTASSIUM SERPL-SCNC: 3.7 MMOL/L — SIGNIFICANT CHANGE UP (ref 3.5–5)
RBC # BLD: 4.36 M/UL — LOW (ref 4.7–6.1)
RBC # FLD: 13.1 % — SIGNIFICANT CHANGE UP (ref 11.5–14.5)
SODIUM SERPL-SCNC: 141 MMOL/L — SIGNIFICANT CHANGE UP (ref 135–146)
WBC # BLD: 5.76 K/UL — SIGNIFICANT CHANGE UP (ref 4.8–10.8)
WBC # FLD AUTO: 5.76 K/UL — SIGNIFICANT CHANGE UP (ref 4.8–10.8)

## 2023-02-02 PROCEDURE — 99232 SBSQ HOSP IP/OBS MODERATE 35: CPT

## 2023-02-02 PROCEDURE — 93010 ELECTROCARDIOGRAM REPORT: CPT

## 2023-02-02 PROCEDURE — 99222 1ST HOSP IP/OBS MODERATE 55: CPT

## 2023-02-02 RX ADMIN — TAMSULOSIN HYDROCHLORIDE 0.8 MILLIGRAM(S): 0.4 CAPSULE ORAL at 11:32

## 2023-02-02 RX ADMIN — Medication 81 MILLIGRAM(S): at 17:21

## 2023-02-02 RX ADMIN — Medication 81 MILLIGRAM(S): at 05:36

## 2023-02-02 RX ADMIN — PANTOPRAZOLE SODIUM 40 MILLIGRAM(S): 20 TABLET, DELAYED RELEASE ORAL at 05:36

## 2023-02-02 RX ADMIN — PREGABALIN 1000 MICROGRAM(S): 225 CAPSULE ORAL at 11:33

## 2023-02-02 RX ADMIN — Medication 500 MILLIGRAM(S): at 11:32

## 2023-02-02 NOTE — CONSULT NOTE ADULT - CONSULT REQUESTED BY NAME
Nephrology Progress Note    Intubated  Sedated  Receiving packed RBC transfusion  On LR at 75 cc an hour    ROS:  Unable to obtain due to clinical condition  MEDICATIONS:  • potassium phosphate IVPB  30 mmol Intravenous Once   • sodium zirconium cyclosilicate  10 g Oral Once   • chlorhexidine gluconate  15 mL Swish & Spit 2 times per day   • ophthalmic gel   Both Eyes Q4H   • sodium chloride (PF)  2 mL Intracatheter 2 times per day   • piperacillin-tazobactam (ZOSYN) extended interval IVPB  4.5 g Intravenous 3 times per day   • nicotine  1 patch Transdermal Daily          Physical Examination:  Vital Signs:    Visit Vitals  /62   Pulse (!) 103   Temp 99.3 °F (37.4 °C)   Resp 20   Ht 5' 9\" (1.753 m)   Wt (!) 175.5 kg (386 lb 14.5 oz)   SpO2 100%   BMI 57.14 kg/m²     General: Intubated, sedated  Head:  Normocephalic-atraumatic.   Neck:  Trachea is midline. No JVD.  Eyes:  Normal conjunctivae.    ENT:   Mucous membranes are moist.    Cardiovascular:  S1, S2 auscultated.  .  Bilateral peripheral pulses are intact.  No edema.  Respiratory:   Bilateral breath sounds heard.  Lungs clear to auscultation.  Symmetric chest wall expansion.  Respirations are non-labored.    Gastrointestinal:  Soft and nontender.  Non-distended.  Hypoactive bowel sounds.    Genitourinary: No CVA tenderness.    Musculoskeletal:  No joint swelling.    Skin: Warm and dry.  No rash noted.    I/O's    Intake/Output Summary (Last 24 hours) at 2/6/2022 0914  Last data filed at 2/6/2022 0700  Gross per 24 hour   Intake 3411.5 ml   Output 1860 ml   Net 1551.5 ml       Labs:    Recent Labs   Lab 02/06/22  0410 02/05/22  0417 02/04/22  1543 02/04/22  0509 02/02/22  0347   SODIUM 150* 147* 147* 145 139   POTASSIUM 3.3* 4.8 4.9 5.0 3.7   CHLORIDE 122* 122* 122* 120* 108*   CO2 22 23 22 19* 27   BUN 38* 44* 50* 49* 15   CREATININE 1.39* 1.71* 2.00* 2.11* 1.20*   GLUCOSE 107* 149* 128* 133* 80   CALCIUM 7.5* 7.6* 7.7* 7.7* 8.6      Recent Labs   Lab  02/06/22  0410   WBC 6.3   RBC 2.05*   HGB 6.1*   HCT 19.3*   PLT 55*        Imaging  XR CHEST PA OR AP 1 VIEW   Final Result   1.  Stable right pleural effusion right basilar airspace opacity.   2.  Improved left pleural effusion and left basilar airspace opacity.      Electronically Signed by: ANDRIA RUIZ M.D.    Signed on: 2/5/2022 1:01 PM          XR CHEST PA OR AP 1 VIEW   Final Result   1.  Increased bilateral pleural effusions and underlying bibasilar airspace   opacities.   2.  Placement of left PICC line in satisfactory position.      Electronically Signed by: ANDRIA RUIZ M.D.    Signed on: 2/5/2022 7:31 AM          XR CHEST PA OR AP 1 VIEW   Final Result   1.    Tip of the endotracheal tube 3 cm above bj.   2.    Small right lung base infiltrate or atelectasis.      Electronically Signed by: SHAE RODRIGUEZ M.D.    Signed on: 2/3/2022 4:33 PM          IR EMBOLIZATION HEMORRHAGE ARTERY VEIN OR LYMPH   Final Result   Successful coil embolization of the gastroduodenal artery and   gastroduodenal artery as described above.      Electronically Signed by: LYRIC LEE M.D.    Signed on: 2/3/2022 6:32 PM          IR CELIAC ANGIOGRAM   Final Result   Successful coil embolization of the gastroduodenal artery and   gastroduodenal artery as described above.      Electronically Signed by: LYRIC LEE M.D.    Signed on: 2/3/2022 6:32 PM          IR SUPERIOR MESENTERIC ANGIOGRAM   Final Result   Successful coil embolization of the gastroduodenal artery and   gastroduodenal artery as described above.      Electronically Signed by: LYRIC LEE M.D.    Signed on: 2/3/2022 6:32 PM          IR ANGIOGRAM THROUGH EXISTING CATHETER POST EMBOLIZATION   Final Result   Successful coil embolization of the gastroduodenal artery and   gastroduodenal artery as described above.      Electronically Signed by: LYRIC LEE M.D.    Signed on: 2/3/2022 6:32 PM          EGD   Final Result      CT ABDOMEN PELVIS W CONTRAST - IV  contrast only   Final Result       Extensive peripancreatic exudate, compatible with pancreatitis, progressed   since prior exam, as described above.      Otherwise, no significant interval change, as described above in detail.      Electronically Signed by: SARA MURRELL M.D.    Signed on: 2/1/2022 12:29 PM          XR CHEST PA OR AP 1 VIEW    (Results Pending)         Assessment and Plan:  Acute kidney injury  -Due to ischemic ATN  -Patient has received multiple units of packed RBC transfusion, FFP since admission  -Continue IV fluids, switch IV fluids to D5 water at 100 cc an hour to provide free water replacement  -Pharmacy to switch diluent to D5 water for all drips (due to hypernatremia/hyperchloremia)  -No acute indication for dialysis at this time  -Monitor chemistries and I's and O's closely  -Avoid nephrotoxins     Hyperkalemia  -Resolved     Hemorrhagic shock  -Remains on vasopressors  -Goal MAP greater than 65     Acute respiratory failure  -Management per ICU team     Acute on chronic pancreatitis  -GI service following    Hypokalemia/hypophosphatemia  -K-Phos has been ordered    Discussed with ICU team    Qing Cole MD     ED

## 2023-02-02 NOTE — PROGRESS NOTE ADULT - SUBJECTIVE AND OBJECTIVE BOX
SUBJECTIVE:    Patient is a 52y old Male who presents with a chief complaint of rectal bleeding (02 Feb 2023 12:29)    Currently admitted to medicine with the primary diagnosis of Bright red blood per rectum       Today is hospital day 1d.     PAST MEDICAL & SURGICAL HISTORY  CVA (cerebral vascular accident)    GERD (gastroesophageal reflux disease)    BPH (benign prostatic hyperplasia)    Renal cancer, right    H/O partial nephrectomy      ALLERGIES:  No Known Allergies    MEDICATIONS:  STANDING MEDICATIONS  ascorbic acid 500 milliGRAM(s) Oral daily  aspirin  chewable 81 milliGRAM(s) Oral two times a day  cyanocobalamin 1000 MICROGram(s) Oral daily  famotidine    Tablet 40 milliGRAM(s) Oral at bedtime  influenza   Vaccine 0.5 milliLiter(s) IntraMuscular once  pantoprazole    Tablet 40 milliGRAM(s) Oral before breakfast  tamsulosin 0.8 milliGRAM(s) Oral daily    PRN MEDICATIONS    VITALS:   T(F): 96.8  HR: 47  BP: 138/73  RR: 18  SpO2: 98%    LABS:                        12.8   5.76  )-----------( 148      ( 02 Feb 2023 06:41 )             36.3     02-02    141  |  105  |  23<H>  ----------------------------<  102<H>  3.7   |  30  |  1.2    Ca    9.0      02 Feb 2023 06:41    TPro  6.6  /  Alb  4.4  /  TBili  0.8  /  DBili  0.2  /  AST  19  /  ALT  28  /  AlkPhos  73  02-01    PT/INR - ( 01 Feb 2023 21:27 )   PT: 11.50 sec;   INR: 1.01 ratio                   CARDIAC MARKERS ( 01 Feb 2023 13:24 )  x     / <0.01 ng/mL / x     / x     / x          RADIOLOGY:    PHYSICAL EXAM:  GEN: No acute distress  LUNGS: Clear to auscultation bilaterally   HEART: S1/S2 present. RRR.   ABD/ GI: Soft, non-tender, non-distended. Bowel sounds present  EXT: NC/NC/NE/2+PP/HICKS  NEURO: AAOX3

## 2023-02-02 NOTE — CONSULT NOTE ADULT - ASSESSMENT
53yo M w/ PMH of CVA, BPH, GERD, Diverticulosis, Internal Hemorrhoids who presents with bright red blood per rectum. patient has longstanding hx of hemorrhoids. S/P Colonoscopy with Dr Wang in 2022 reveling Hemorrhoids and Diverticulosis.     # Hematochezia likely 2/2 Hemorrhoidal bleeding less likely Diverticulosis.   Hemodynamically stable with stable hemoglobin. ROLAN empty vault. Patient had BM in AM with no blood.     PLAN   Patient will follow up with Dr Wang for Routine colonoscopy with possible ligation.   Avoid Constipation   Anusol cream   Call as needed   Plan discussed w pt and his wife at bedside

## 2023-02-02 NOTE — CONSULT NOTE ADULT - SUBJECTIVE AND OBJECTIVE BOX
Gastroenterology Consultation:    Patient is a 52y old  Male who presents with a chief complaint of rectal bleeding (02 Feb 2023 07:11)    Admitted on: 02-01-23    HPI:  Patient is a 51yo M w/ PMH of CVA, BPH, GERD, Diverticulosis, Internal Hemorrhoids who presents with bright red blood per rectum. Patient states that after he used the bathroom on the morning of presentation, he noticed blood on the toilet paper. This has been happening about 2x/wk for many years, but usually the amount of blood is small and the bleeding stops on its own. This morning however, the patient recounts that the bleeding did not stop and he went through an entire roll of toilet paper before coming into hospital to be examined. There were no other symptoms and the patient specifically denies nausea, vomiting, abdominal pain. Of note the patient does take aspirin daily for his history of stroke, but no other anticoagulating medications. In the ED,ROLAN was positive for gross red blood and palpable internal hemorrhoids.      Prior EGD:  no records available   Prior Colonoscopy:  last year by Dr Wang showed Diverticulosis and internal hemorrhoids polyps removed. no records available     PAST MEDICAL & SURGICAL HISTORY:  CVA (cerebral vascular accident)      GERD (gastroesophageal reflux disease)      BPH (benign prostatic hyperplasia)      Renal cancer, right      H/O partial nephrectomy      FAMILY HISTORY:  Family history of diabetes mellitus in father (Father)  No Fh of GI cancers   Family history of thyroid dysfunction (Mother)        Social History:  Tobacco: Denies   Alcohol: Denies   Drugs: Denies     Home Medications:  alfuzosin 10 mg oral tablet, extended release: 1 tab(s) orally once a day (13 Jul 2018 14:08)  famotidine 40 mg oral tablet: 1 tab(s) orally once a day (at bedtime) (01 Feb 2023 16:04)  NexIUM 40 mg oral delayed release capsule: 1 cap(s) orally once a day (13 Jul 2018 14:08)  tadalafil 5 mg oral tablet: 1 tab(s) orally once a day (01 Feb 2023 16:03)  Vitamin C 25 mg oral tablet, chewable: 1 tab(s) orally once a day (13 Jul 2018 14:08)  Vitamin D3 1000 intl units oral tablet: 1 tab(s) orally once a day (13 Jul 2018 14:08)        MEDICATIONS  (STANDING):  ascorbic acid 500 milliGRAM(s) Oral daily  aspirin  chewable 81 milliGRAM(s) Oral two times a day  cyanocobalamin 1000 MICROGram(s) Oral daily  famotidine    Tablet 40 milliGRAM(s) Oral at bedtime  influenza   Vaccine 0.5 milliLiter(s) IntraMuscular once  pantoprazole    Tablet 40 milliGRAM(s) Oral before breakfast  tamsulosin 0.8 milliGRAM(s) Oral daily    MEDICATIONS  (PRN):      Allergies  No Known Allergies      Review of Systems:   Constitutional:  No Fever, No Chills  ENT/Mouth:  No Hearing Changes,  No Difficulty Swallowing  Eyes:  No Eye Pain, No Vision Changes  Cardiovascular:  No Chest Pain, No Palpitations  Respiratory:  No Cough, No Dyspnea  Gastrointestinal:  As described in HPI  Musculoskeletal:  No Joint Swelling, No Back Pain  Skin:  No Skin Lesions, No Jaundice  Neuro:  No Syncope, No Dizziness  Heme/Lymph:  No Bruising, No Bleeding.      Physical Examination:  T(C): 36 (02-02-23 @ 09:00), Max: 37 (02-01-23 @ 22:52)  HR: 47 (02-02-23 @ 09:00) (45 - 47)  BP: 138/73 (02-02-23 @ 09:00) (138/67 - 160/88)  RR: 18 (02-02-23 @ 09:00) (18 - 18)  SpO2: 98% (02-02-23 @ 09:00) (98% - 99%)          GENERAL: AAOx3, no acute distress.  HEAD:  Atraumatic, Normocephalic  EYES: conjunctiva and sclera clear  NECK: Supple, no JVD or thyromegaly  CHEST/LUNG: Clear to auscultation bilaterally; No wheeze, rhonchi, or rales  HEART: Regular rate and rhythm; normal S1, S2, No murmurs.  ABDOMEN: Soft, nontender, nondistended ROLAN empty vault   NEUROLOGY: No asterixis or tremor.   SKIN: Intact, no jaundice        Data:                        12.8   5.76  )-----------( 148      ( 02 Feb 2023 06:41 )             36.3     Hgb Trend:  12.8  02-02-23 @ 06:41  13.2  02-01-23 @ 21:27  12.9  02-01-23 @ 13:24      02-02    141  |  105  |  23<H>  ----------------------------<  102<H>  3.7   |  30  |  1.2    Ca    9.0      02 Feb 2023 06:41    TPro  6.6  /  Alb  4.4  /  TBili  0.8  /  DBili  0.2  /  AST  19  /  ALT  28  /  AlkPhos  73  02-01    Liver panel trend:  TBili 0.8   /   AST 19   /   ALT 28   /   AlkP 73   /   Tptn 6.6   /   Alb 4.4    /   DBili 0.2      02-01      PT/INR - ( 01 Feb 2023 21:27 )   PT: 11.50 sec;   INR: 1.01 ratio      Radiology:

## 2023-02-02 NOTE — PROGRESS NOTE ADULT - SUBJECTIVE AND OBJECTIVE BOX
HPI:  Patient is a 51yo M w/ PMH of CVA, BPH, GERD, Diverticulosis, Internal Hemorrhoids who presents with bright red blood per rectum that began this morning. Patient states that after he used the bathroom on the morning of presentation, he noticed blood on the toilet paper. This has been happening about 2x/wk for many years, but usually the amount of blood is small and the bleeding stops on its own. This morning however, the patient recounts that the bleeding did not stop and he went through an entire roll of toilet paper before coming into hospital to be examined. There were no other symptoms and the patient specifically denies nausea, vomiting, abdominal pain. Of note the patient does take aspirin daily for his history of stroke, but no other anticoagulating medications.    In the ED,    Vital Signs Last 24 Hrs  T(C): 37.1 (01 Feb 2023 11:30), Max: 37.1 (01 Feb 2023 11:30)  T(F): 98.8 (01 Feb 2023 11:30), Max: 98.8 (01 Feb 2023 11:30)  HR: 54 (01 Feb 2023 11:30) (54 - 54)  BP: 163/77 (01 Feb 2023 11:30) (163/77 - 163/77)  BP(mean): --  RR: 18 (01 Feb 2023 11:30) (18 - 18)  SpO2: 100% (01 Feb 2023 11:30) (100% - 100%)    Parameters below as of 01 Feb 2023 11:30  Patient On (Oxygen Delivery Method): room air        ROLAN was positive for gross red blood and palpable internal hemorrhoids.                          12.9   5.50  )-----------( 152      ( 01 Feb 2023 13:24 )             36.5     02-01    141  |  107  |  20  ----------------------------<  100<H>  4.9   |  27  |  1.1    Ca    9.3      01 Feb 2023 13:24    TPro  6.6  /  Alb  4.4  /  TBili  0.8  /  DBili  0.2  /  AST  19  /  ALT  28  /  AlkPhos  73  02-01    negative troponins    Patient was admitted for management of rectal bleeding.   (01 Feb 2023 15:46)    Currently admitted to medicine with the primary diagnosis of Bright red blood per rectum       Today is hospital day 1d.     INTERVAL HPI / OVERNIGHT EVENTS:  Patient was examined and seen at bedside. This morning he is resting comfortably in bed and reports no new issues or overnight events. Vitals stable, patient tolerating oral diet, voiding appropriately, having regular BMs. Will continue to monitor.     ROS: Otherwise unremarkable     PAST MEDICAL & SURGICAL HISTORY  CVA (cerebral vascular accident)    GERD (gastroesophageal reflux disease)    BPH (benign prostatic hyperplasia)    Renal cancer, right    H/O partial nephrectomy      ALLERGIES  No Known Allergies    MEDICATIONS  STANDING MEDICATIONS  ascorbic acid 500 milliGRAM(s) Oral daily  aspirin  chewable 81 milliGRAM(s) Oral two times a day  cyanocobalamin 1000 MICROGram(s) Oral daily  famotidine    Tablet 40 milliGRAM(s) Oral at bedtime  influenza   Vaccine 0.5 milliLiter(s) IntraMuscular once  pantoprazole    Tablet 40 milliGRAM(s) Oral before breakfast  tamsulosin 0.8 milliGRAM(s) Oral daily    PRN MEDICATIONS    VITALS:  T(F): 98.6  HR: 45  BP: 160/88  RR: 18  SpO2: 99%    PHYSICAL EXAM  GEN: NAD, Resting comfortably in bed, cooperative  PULM: Clear to auscultation bilaterally, No wheezing, rales, rhonchi  CVS: Regular rate and rhythm, S1-S2, no murmurs, heaves, thrills  ABD: Soft, non-tender, non-distended, no guarding, BS +  EXT: No edema/cyanosis, extremities warm/dry, peripheral pulses palpable   NEURO: A&Ox3, no focal deficits    LABS                        13.2   5.92  )-----------( 159      ( 01 Feb 2023 21:27 )             37.7     02-01    141  |  107  |  20  ----------------------------<  100<H>  4.9   |  27  |  1.1    Ca    9.3      01 Feb 2023 13:24    TPro  6.6  /  Alb  4.4  /  TBili  0.8  /  DBili  0.2  /  AST  19  /  ALT  28  /  AlkPhos  73  02-01    PT/INR - ( 01 Feb 2023 21:27 )   PT: 11.50 sec;   INR: 1.01 ratio               Troponin T, Serum: <0.01 ng/mL (02-01-23 @ 13:24)      CARDIAC MARKERS ( 01 Feb 2023 13:24 )  x     / <0.01 ng/mL / x     / x     / x            RADIOLOGY     HPI:  Patient is a 51yo M w/ PMH of CVA, BPH, GERD, Diverticulosis, Internal Hemorrhoids who presents with bright red blood per rectum that began this morning. Patient states that after he used the bathroom on the morning of presentation, he noticed blood on the toilet paper. This has been happening about 2x/wk for many years, but usually the amount of blood is small and the bleeding stops on its own. This morning however, the patient recounts that the bleeding did not stop and he went through an entire roll of toilet paper before coming into hospital to be examined. There were no other symptoms and the patient specifically denies nausea, vomiting, abdominal pain. Of note the patient does take aspirin daily for his history of stroke, but no other anticoagulating medications.    In the ED,    Vital Signs Last 24 Hrs  T(C): 37.1 (01 Feb 2023 11:30), Max: 37.1 (01 Feb 2023 11:30)  T(F): 98.8 (01 Feb 2023 11:30), Max: 98.8 (01 Feb 2023 11:30)  HR: 54 (01 Feb 2023 11:30) (54 - 54)  BP: 163/77 (01 Feb 2023 11:30) (163/77 - 163/77)  BP(mean): --  RR: 18 (01 Feb 2023 11:30) (18 - 18)  SpO2: 100% (01 Feb 2023 11:30) (100% - 100%)    Parameters below as of 01 Feb 2023 11:30  Patient On (Oxygen Delivery Method): room air        ROLAN was positive for gross red blood and palpable internal hemorrhoids.                          12.9   5.50  )-----------( 152      ( 01 Feb 2023 13:24 )             36.5     02-01    141  |  107  |  20  ----------------------------<  100<H>  4.9   |  27  |  1.1    Ca    9.3      01 Feb 2023 13:24    TPro  6.6  /  Alb  4.4  /  TBili  0.8  /  DBili  0.2  /  AST  19  /  ALT  28  /  AlkPhos  73  02-01    negative troponins    Patient was admitted for management of rectal bleeding.   (01 Feb 2023 15:46)    Currently admitted to medicine with the primary diagnosis of Bright red blood per rectum       Today is hospital day 1d.     INTERVAL HPI / OVERNIGHT EVENTS:  Patient was examined and seen at bedside. This morning he is resting comfortably in bed and reports no new issues or overnight events. Vitals stable, patient currently NPO, has not reported any rectal bleeding since admission. Had a BM , reported no BRBPR. Will continue to monitor.     ROS: Otherwise unremarkable     PAST MEDICAL & SURGICAL HISTORY  CVA (cerebral vascular accident)    GERD (gastroesophageal reflux disease)    BPH (benign prostatic hyperplasia)    Renal cancer, right    H/O partial nephrectomy      ALLERGIES  No Known Allergies    MEDICATIONS  STANDING MEDICATIONS  ascorbic acid 500 milliGRAM(s) Oral daily  aspirin  chewable 81 milliGRAM(s) Oral two times a day  cyanocobalamin 1000 MICROGram(s) Oral daily  famotidine    Tablet 40 milliGRAM(s) Oral at bedtime  influenza   Vaccine 0.5 milliLiter(s) IntraMuscular once  pantoprazole    Tablet 40 milliGRAM(s) Oral before breakfast  tamsulosin 0.8 milliGRAM(s) Oral daily    PRN MEDICATIONS    VITALS:  T(F): 98.6  HR: 45  BP: 160/88  RR: 18  SpO2: 99%    PHYSICAL EXAM  GEN: NAD, Resting comfortably in bed, cooperative  PULM: Clear to auscultation bilaterally, No wheezing, rales, rhonchi  CVS: Regular rate and rhythm, S1-S2, no murmurs, heaves, thrills  ABD: Soft, non-tender, non-distended, no guarding, BS +  EXT: No edema/cyanosis, extremities warm/dry, peripheral pulses palpable   NEURO: A&Ox3, no focal deficits    LABS                        13.2   5.92  )-----------( 159      ( 01 Feb 2023 21:27 )             37.7     02-01    141  |  107  |  20  ----------------------------<  100<H>  4.9   |  27  |  1.1    Ca    9.3      01 Feb 2023 13:24    TPro  6.6  /  Alb  4.4  /  TBili  0.8  /  DBili  0.2  /  AST  19  /  ALT  28  /  AlkPhos  73  02-01    PT/INR - ( 01 Feb 2023 21:27 )   PT: 11.50 sec;   INR: 1.01 ratio               Troponin T, Serum: <0.01 ng/mL (02-01-23 @ 13:24)      CARDIAC MARKERS ( 01 Feb 2023 13:24 )  x     / <0.01 ng/mL / x     / x     / x            RADIOLOGY

## 2023-02-02 NOTE — PROGRESS NOTE ADULT - ASSESSMENT
Patient is a 53yo M w/ PMH of CVA, BPH, GERD, Diverticulosis, Internal Hemorrhoids who presents with bright red blood per rectum that began the morning of presentation.    #Rectal bleeding likely 2/2 internal hemorrhoids  - currently hemodynamically stable  - ROLAN positive for fresh blood in ED, hemorrhoids palpated  - H/H 12.9/36.5  - type and screen completed in ED on 2/1/23  - EGD and colonoscopy in Fall 2022 that showed diverticulosis and internal hemorrhoids per patient (Dr. Wang)   - trend CBC --- GI consult has seen patient and needs to follow with his outpatient doctor-- Dr Wang for ligation and colonoscopy. No bleeding today    #Hx of CVA  - c/w home medication aspirin 81mg BID    #BPH  - c/w home medication Alfuzosin 10mg daily  - c/w home medication tadalafil 5mg daily    #GERD  - c/w home medication esomeprazole 40mg am daily  - c/w home medication famotidine 40 mg at bedtime    DVT Prophylaxis: Lovenox  Diet: regular      DC plan AM--

## 2023-02-02 NOTE — PROGRESS NOTE ADULT - ASSESSMENT
# To follow up    # Misc  - DVT Prophylaxis: - GI Prophylaxis: famotidine    Tablet 40 milliGRAM(s) Oral at bedtime  pantoprazole    Tablet 40 milliGRAM(s) Oral before breakfast  - Diet: Diet, Regular  - Activity: Activity - Ambulate as Tolerated  - Code Status: Full     Ashok Finney  PGY1, Internal Medicine   [facility]   52y M pmh CVA, BPH, GERD, diverticulosis, internal hemorrhoids presenting with rectal bleeding admitted for management of hemorrhoidal vs diverticular bleed.     #Hematochezia 2/2 internal hemorrhoids vs diverticular bleed  - hemodynamically stable   - reported persistent BRBPR at home after BM  - hgb trend 13.2->12.8  - ROLAN on admission +ve for hematochezia, repeat in AM empty vault  - BM this AM negative for blood   - GI following - patient s/p colonoscopy 2022 with internal hemorrhoids and diverticulosis - Dr. Wang 2022  - f/u with Dr. Wang for routine colonoscopy/ possible ligation   - anusol cream, avoid constipation, advance regular diet   - monitor bleeding, repeat hgb in AM     #BPH  - c/w flomax 0.8mg qD  - no signs of retention at this time    #GERD  - c/w 40mg famotidine qhs, pantoprazole 40mg qD    #Hx CVA   - c/w ASA 81 qD    # To follow up  - monitor for symptomatic bleed  - hgb in AM   - anticipate dc in am     # Misc  - DVT Prophylaxis: none   - GI Prophylaxis: famotidine Tablet 40 milliGRAM(s) Oral at bedtime, pantoprazole    Tablet 40 milliGRAM(s) Oral before breakfast  - Diet: Diet, Regular  - Activity: Activity - Ambulate as Tolerated  - Code Status: Full     Ashok Finney  PGY1, Internal Medicine   [facility]

## 2023-02-03 ENCOUNTER — TRANSCRIPTION ENCOUNTER (OUTPATIENT)
Age: 53
End: 2023-02-03

## 2023-02-03 VITALS
TEMPERATURE: 97 F | RESPIRATION RATE: 18 BRPM | HEART RATE: 54 BPM | OXYGEN SATURATION: 98 % | DIASTOLIC BLOOD PRESSURE: 84 MMHG | SYSTOLIC BLOOD PRESSURE: 136 MMHG

## 2023-02-03 LAB
ALBUMIN SERPL ELPH-MCNC: 4.2 G/DL — SIGNIFICANT CHANGE UP (ref 3.5–5.2)
ALP SERPL-CCNC: 68 U/L — SIGNIFICANT CHANGE UP (ref 30–115)
ALT FLD-CCNC: 25 U/L — SIGNIFICANT CHANGE UP (ref 0–41)
ANION GAP SERPL CALC-SCNC: 9 MMOL/L — SIGNIFICANT CHANGE UP (ref 7–14)
AST SERPL-CCNC: 17 U/L — SIGNIFICANT CHANGE UP (ref 0–41)
BASOPHILS # BLD AUTO: 0.03 K/UL — SIGNIFICANT CHANGE UP (ref 0–0.2)
BASOPHILS NFR BLD AUTO: 0.6 % — SIGNIFICANT CHANGE UP (ref 0–1)
BILIRUB SERPL-MCNC: 1 MG/DL — SIGNIFICANT CHANGE UP (ref 0.2–1.2)
BUN SERPL-MCNC: 22 MG/DL — HIGH (ref 10–20)
CALCIUM SERPL-MCNC: 9.2 MG/DL — SIGNIFICANT CHANGE UP (ref 8.4–10.5)
CHLORIDE SERPL-SCNC: 104 MMOL/L — SIGNIFICANT CHANGE UP (ref 98–110)
CO2 SERPL-SCNC: 27 MMOL/L — SIGNIFICANT CHANGE UP (ref 17–32)
CREAT SERPL-MCNC: 1.1 MG/DL — SIGNIFICANT CHANGE UP (ref 0.7–1.5)
EGFR: 80 ML/MIN/1.73M2 — SIGNIFICANT CHANGE UP
EOSINOPHIL # BLD AUTO: 0.38 K/UL — SIGNIFICANT CHANGE UP (ref 0–0.7)
EOSINOPHIL NFR BLD AUTO: 7.2 % — SIGNIFICANT CHANGE UP (ref 0–8)
GLUCOSE SERPL-MCNC: 104 MG/DL — HIGH (ref 70–99)
HCT VFR BLD CALC: 35.2 % — LOW (ref 42–52)
HGB BLD-MCNC: 12.6 G/DL — LOW (ref 14–18)
IMM GRANULOCYTES NFR BLD AUTO: 0.4 % — HIGH (ref 0.1–0.3)
LYMPHOCYTES # BLD AUTO: 1.04 K/UL — LOW (ref 1.2–3.4)
LYMPHOCYTES # BLD AUTO: 19.6 % — LOW (ref 20.5–51.1)
MAGNESIUM SERPL-MCNC: 2.1 MG/DL — SIGNIFICANT CHANGE UP (ref 1.8–2.4)
MCHC RBC-ENTMCNC: 29.6 PG — SIGNIFICANT CHANGE UP (ref 27–31)
MCHC RBC-ENTMCNC: 35.8 G/DL — SIGNIFICANT CHANGE UP (ref 32–37)
MCV RBC AUTO: 82.8 FL — SIGNIFICANT CHANGE UP (ref 80–94)
MONOCYTES # BLD AUTO: 0.4 K/UL — SIGNIFICANT CHANGE UP (ref 0.1–0.6)
MONOCYTES NFR BLD AUTO: 7.5 % — SIGNIFICANT CHANGE UP (ref 1.7–9.3)
NEUTROPHILS # BLD AUTO: 3.43 K/UL — SIGNIFICANT CHANGE UP (ref 1.4–6.5)
NEUTROPHILS NFR BLD AUTO: 64.7 % — SIGNIFICANT CHANGE UP (ref 42.2–75.2)
NRBC # BLD: 0 /100 WBCS — SIGNIFICANT CHANGE UP (ref 0–0)
PLATELET # BLD AUTO: 159 K/UL — SIGNIFICANT CHANGE UP (ref 130–400)
POTASSIUM SERPL-MCNC: 3.9 MMOL/L — SIGNIFICANT CHANGE UP (ref 3.5–5)
POTASSIUM SERPL-SCNC: 3.9 MMOL/L — SIGNIFICANT CHANGE UP (ref 3.5–5)
PROT SERPL-MCNC: 6.2 G/DL — SIGNIFICANT CHANGE UP (ref 6–8)
RBC # BLD: 4.25 M/UL — LOW (ref 4.7–6.1)
RBC # FLD: 12.9 % — SIGNIFICANT CHANGE UP (ref 11.5–14.5)
SODIUM SERPL-SCNC: 140 MMOL/L — SIGNIFICANT CHANGE UP (ref 135–146)
WBC # BLD: 5.3 K/UL — SIGNIFICANT CHANGE UP (ref 4.8–10.8)
WBC # FLD AUTO: 5.3 K/UL — SIGNIFICANT CHANGE UP (ref 4.8–10.8)

## 2023-02-03 PROCEDURE — 99239 HOSP IP/OBS DSCHRG MGMT >30: CPT

## 2023-02-03 RX ORDER — HYDROCORTISONE 1 %
1 OINTMENT (GRAM) TOPICAL
Qty: 1 | Refills: 0
Start: 2023-02-03 | End: 2023-03-04

## 2023-02-03 RX ORDER — HYDROCORTISONE 1 %
1 OINTMENT (GRAM) TOPICAL DAILY
Refills: 0 | Status: DISCONTINUED | OUTPATIENT
Start: 2023-02-03 | End: 2023-02-03

## 2023-02-03 RX ORDER — POLYETHYLENE GLYCOL 3350 17 G/17G
17 POWDER, FOR SOLUTION ORAL
Qty: 1 | Refills: 0
Start: 2023-02-03 | End: 2023-03-04

## 2023-02-03 RX ADMIN — PANTOPRAZOLE SODIUM 40 MILLIGRAM(S): 20 TABLET, DELAYED RELEASE ORAL at 05:23

## 2023-02-03 RX ADMIN — Medication 500 MILLIGRAM(S): at 11:08

## 2023-02-03 RX ADMIN — Medication 81 MILLIGRAM(S): at 05:24

## 2023-02-03 RX ADMIN — TAMSULOSIN HYDROCHLORIDE 0.8 MILLIGRAM(S): 0.4 CAPSULE ORAL at 11:08

## 2023-02-03 RX ADMIN — PREGABALIN 1000 MICROGRAM(S): 225 CAPSULE ORAL at 11:07

## 2023-02-03 NOTE — DISCHARGE NOTE PROVIDER - HOSPITAL COURSE
Patient is a 51yo M w/ PMH of CVA, BPH, GERD, Diverticulosis, Internal Hemorrhoids who presents with bright red blood per rectum that began this morning. Patient states that after he used the bathroom on the morning of presentation, he noticed blood on the toilet paper. This has been happening about 2x/wk for many years, but usually the amount of blood is small and the bleeding stops on its own. This morning however, the patient recounts that the bleeding did not stop and he went through an entire roll of toilet paper before coming into hospital to be examined. There were no other symptoms and the patient specifically denies nausea, vomiting, abdominal pain. Of note the patient does take aspirin daily for his history of stroke, but no other anticoagulating medications.    In the ED,    Vital Signs Last 24 Hrs  T(C): 37.1 (01 Feb 2023 11:30), Max: 37.1 (01 Feb 2023 11:30)  T(F): 98.8 (01 Feb 2023 11:30), Max: 98.8 (01 Feb 2023 11:30)  HR: 54 (01 Feb 2023 11:30) (54 - 54)  BP: 163/77 (01 Feb 2023 11:30) (163/77 - 163/77)  RR: 18 (01 Feb 2023 11:30) (18 - 18)  SpO2: 100% (01 Feb 2023 11:30) (100% - 100%)    Parameters below as of 01 Feb 2023 11:30  Patient On (Oxygen Delivery Method): room air  ROLAN was positive for gross red blood and palpable internal hemorrhoids.    Patient was admitted to medicine for LGIB 2/2 suspected internal hemorrhoids vs diverticular bleed. During patient admission he remained hemodynamically stable. GI evaluated the patient, on exam his repeat ROLAN was negative for blood in the rectal vault. Patient had recent colonoscopy detailing internal hemorrhoids and diverticulosis. Patient tolerated oral diet. No episodes of bloody bowel movements. Will send home with anusol cream and stool softener and f/u with GI op for management. Medically stable for d/c.

## 2023-02-03 NOTE — DISCHARGE NOTE PROVIDER - NSDCMRMEDTOKEN_GEN_ALL_CORE_FT
alfuzosin 10 mg oral tablet, extended release: 1 tab(s) orally once a day  Anusol-HC 2.5% topical cream: Apply topically to affected area 2 times a day   aspirin 81 mg oral tablet: 2 tab(s) orally once a day   atorvastatin 80 mg oral tablet: 1 tab(s) orally once a day (at bedtime)  famotidine 40 mg oral tablet: 1 tab(s) orally once a day (at bedtime)  MiraLax oral powder for reconstitution: 17 gram(s) orally 2 times a day   NexIUM 40 mg oral delayed release capsule: 1 cap(s) orally once a day  tadalafil 5 mg oral tablet: 1 tab(s) orally once a day  Vitamin C 25 mg oral tablet, chewable: 1 tab(s) orally once a day  Vitamin D3 1000 intl units oral tablet: 1 tab(s) orally once a day

## 2023-02-03 NOTE — DISCHARGE NOTE PROVIDER - PROVIDER TOKENS
PROVIDER:[TOKEN:[[id number]],FOLLOWUP:[1 week]],PROVIDER:[TOKEN:[[id number]],FOLLOWUP:[2 weeks]]

## 2023-02-03 NOTE — DISCHARGE NOTE PROVIDER - NSDCCPCAREPLAN_GEN_ALL_CORE_FT
PRINCIPAL DISCHARGE DIAGNOSIS  Diagnosis: Bright red blood per rectum  Assessment and Plan of Treatment: You were admitted for bleeding. We suspect it is from your internal hemorrhoids. Hemorrhoids can bleed from time to time during periods of increased straining or constipation. Your blood levels are stable, you were seen by the gastroenterologist team here and they recommend you follow up with them outpatient to monitor for symptoms of bleeding. We are sending you home with a steroid cream that will reduce the inflammation and bleeding and with a stool softener to prevent constipation. If you continue to have persistent bleeding please reach out to your PCP of GI doctor, they may discuss with you other options to treat the hemorrhoids such as banding or ligation.      SECONDARY DISCHARGE DIAGNOSES  Diagnosis: Colon, diverticulosis  Assessment and Plan of Treatment:

## 2023-02-03 NOTE — DISCHARGE NOTE NURSING/CASE MANAGEMENT/SOCIAL WORK - PATIENT PORTAL LINK FT
You can access the FollowMyHealth Patient Portal offered by [facility] by registering at the following website: [email or web address]. By joining [employer]’s FollowMyHealth portal, you will also be able to view your health information using other applications (apps) compatible with our system.

## 2023-02-03 NOTE — PROGRESS NOTE ADULT - ASSESSMENT
Patient is a 53yo M w/ PMH of CVA, BPH, GERD, Diverticulosis, Internal Hemorrhoids who presents with bright red blood per rectum that began the morning of presentation.    #Rectal bleeding likely 2/2 internal hemorrhoids  - currently hemodynamically stable  - ROLAN positive for fresh blood in ED, hemorrhoids palpated  - H/H 12.6  - type and screen completed in ED on 2/1/23  - EGD and colonoscopy in Fall 2022 that showed diverticulosis and internal hemorrhoids per patient (Dr. Wang)   -  GI consult has seen patient and needs to follow with his outpatient doctor-- Dr Wang for ligation and colonoscopy. No bleeding today    #Hx of CVA  - c/w home medication aspirin 81mg BID    #BPH  - c/w home medication Alfuzosin 10mg daily  - c/w home medication tadalafil 5mg daily    #GERD  - c/w home medication esomeprazole 40mg am daily  - c/w home medication famotidine 40 mg at bedtime    DVT Prophylaxis: Lovenox  Diet: regular      DC plan today-- spent more than 30mins

## 2023-02-03 NOTE — DISCHARGE NOTE PROVIDER - CARE PROVIDER_API CALL
Bo Fraga)  Internal Medicine  [address]  [place] [address]  Phone: [phone]  Fax: [phone]  Follow Up Time: 1 week    Ander Wang  Internal Medicine  [address]  [facility],    Phone: ()-  Fax: ()-  Follow Up Time: 2 weeks

## 2023-02-03 NOTE — PROGRESS NOTE ADULT - SUBJECTIVE AND OBJECTIVE BOX
SUBJECTIVE:    Patient is a 52y old Male who presents with a chief complaint of rectal bleeding (03 Feb 2023 09:00)    Currently admitted to medicine with the primary diagnosis of Bright red blood per rectum       Today is hospital day 2d.     PAST MEDICAL & SURGICAL HISTORY  CVA (cerebral vascular accident)    GERD (gastroesophageal reflux disease)    BPH (benign prostatic hyperplasia)    Renal cancer, right    H/O partial nephrectomy      ALLERGIES:  No Known Allergies    MEDICATIONS:  STANDING MEDICATIONS  ascorbic acid 500 milliGRAM(s) Oral daily  aspirin  chewable 81 milliGRAM(s) Oral two times a day  cyanocobalamin 1000 MICROGram(s) Oral daily  famotidine    Tablet 40 milliGRAM(s) Oral at bedtime  hydrocortisone hemorrhoidal Suppository 1 Suppository(s) Rectal daily  influenza   Vaccine 0.5 milliLiter(s) IntraMuscular once  pantoprazole    Tablet 40 milliGRAM(s) Oral before breakfast  tamsulosin 0.8 milliGRAM(s) Oral daily    PRN MEDICATIONS    VITALS:   T(F): 96.8  HR: 54  BP: 136/84  RR: 18  SpO2: 98%    LABS:                        12.6   5.30  )-----------( 159      ( 03 Feb 2023 07:48 )             35.2     02-03    140  |  104  |  22<H>  ----------------------------<  104<H>  3.9   |  27  |  1.1    Ca    9.2      03 Feb 2023 07:48  Mg     2.1     02-03    TPro  6.2  /  Alb  4.2  /  TBili  1.0  /  DBili  x   /  AST  17  /  ALT  25  /  AlkPhos  68  02-03    PT/INR - ( 01 Feb 2023 21:27 )   PT: 11.50 sec;   INR: 1.01 ratio                   CARDIAC MARKERS ( 01 Feb 2023 13:24 )  x     / <0.01 ng/mL / x     / x     / x          RADIOLOGY:    PHYSICAL EXAM:  GEN: No acute distress  LUNGS: Clear to auscultation bilaterally   HEART: S1/S2 present. RRR.   ABD/ GI: Soft, non-tender, non-distended. Bowel sounds present  EXT: NC/NC/NE/2+PP/HICKS  NEURO: AAOX3

## 2023-02-09 DIAGNOSIS — Z86.73 PERSONAL HISTORY OF TRANSIENT ISCHEMIC ATTACK (TIA), AND CEREBRAL INFARCTION WITHOUT RESIDUAL DEFICITS: ICD-10-CM

## 2023-02-09 DIAGNOSIS — K64.8 OTHER HEMORRHOIDS: ICD-10-CM

## 2023-02-09 DIAGNOSIS — Z79.82 LONG TERM (CURRENT) USE OF ASPIRIN: ICD-10-CM

## 2023-02-09 DIAGNOSIS — K62.5 HEMORRHAGE OF ANUS AND RECTUM: ICD-10-CM

## 2023-02-09 DIAGNOSIS — N40.0 BENIGN PROSTATIC HYPERPLASIA WITHOUT LOWER URINARY TRACT SYMPTOMS: ICD-10-CM

## 2023-02-09 DIAGNOSIS — Z85.528 PERSONAL HISTORY OF OTHER MALIGNANT NEOPLASM OF KIDNEY: ICD-10-CM

## 2023-02-09 DIAGNOSIS — K21.9 GASTRO-ESOPHAGEAL REFLUX DISEASE WITHOUT ESOPHAGITIS: ICD-10-CM

## 2023-02-09 DIAGNOSIS — Z20.822 CONTACT WITH AND (SUSPECTED) EXPOSURE TO COVID-19: ICD-10-CM

## 2023-08-07 NOTE — H&P ADULT - HISTORY OF PRESENT ILLNESS
Patient called.  Saw PCP and had labs done.  PCP advised patient to contact Endo for possible med adjustment.  Labs in system.   Patient is a 53yo M w/ PMH of CVA, BPH, GERD, Diverticulosis, Internal Hemorrhoids who presents with bright red blood per rectum that began this morning. Patient states that after he used the bathroom on the morning of presentation, he noticed blood on the toilet paper. This has been happening about 2x/wk for many years, but usually the amount of blood is small and the bleeding stops on its own. This morning however, the patient recounts that the bleeding did not stop and he went through an entire roll of toilet paper before coming into hospital to be examined. There were no other symptoms and the patient specifically denies nausea, vomiting, abdominal pain. Of note the patient does take aspirin daily for his history of stroke, but no other anticoagulating medications.    In the ED,    Vital Signs Last 24 Hrs  T(C): 37.1 (01 Feb 2023 11:30), Max: 37.1 (01 Feb 2023 11:30)  T(F): 98.8 (01 Feb 2023 11:30), Max: 98.8 (01 Feb 2023 11:30)  HR: 54 (01 Feb 2023 11:30) (54 - 54)  BP: 163/77 (01 Feb 2023 11:30) (163/77 - 163/77)  BP(mean): --  RR: 18 (01 Feb 2023 11:30) (18 - 18)  SpO2: 100% (01 Feb 2023 11:30) (100% - 100%)    Parameters below as of 01 Feb 2023 11:30  Patient On (Oxygen Delivery Method): room air        ROLAN was positive for gross red blood and palpable internal hemorrhoids.                          12.9   5.50  )-----------( 152      ( 01 Feb 2023 13:24 )             36.5     02-01    141  |  107  |  20  ----------------------------<  100<H>  4.9   |  27  |  1.1    Ca    9.3      01 Feb 2023 13:24    TPro  6.6  /  Alb  4.4  /  TBili  0.8  /  DBili  0.2  /  AST  19  /  ALT  28  /  AlkPhos  73  02-01    negative troponins    Patient was admitted for management of rectal bleeding.